# Patient Record
Sex: MALE | Race: WHITE | NOT HISPANIC OR LATINO | Employment: FULL TIME | URBAN - METROPOLITAN AREA
[De-identification: names, ages, dates, MRNs, and addresses within clinical notes are randomized per-mention and may not be internally consistent; named-entity substitution may affect disease eponyms.]

---

## 2017-11-14 ENCOUNTER — ALLSCRIPTS OFFICE VISIT (OUTPATIENT)
Dept: OTHER | Facility: OTHER | Age: 52
End: 2017-11-14

## 2017-11-14 DIAGNOSIS — Z12.5 ENCOUNTER FOR SCREENING FOR MALIGNANT NEOPLASM OF PROSTATE: ICD-10-CM

## 2017-11-14 DIAGNOSIS — L98.9 DISORDER OF SKIN OR SUBCUTANEOUS TISSUE: ICD-10-CM

## 2017-11-14 DIAGNOSIS — Z13.6 ENCOUNTER FOR SCREENING FOR CARDIOVASCULAR DISORDERS: ICD-10-CM

## 2017-11-15 NOTE — PROGRESS NOTES
Assessment    1  Family history of cerebral aneurysm (V17 1) (Z82 49) : Father   2  Family history of malignant neoplasm of prostate (V16 42) (H42 50) : Father   3  Family history of coronary artery disease (V17 3) (Z82 49) : Father   4  Chest skin lesion (709 9) (L98 9)   5  Screening for colon cancer (V76 51) (Z12 11)   6  Family history of myocardial infarction (V17 3) (Z82 49) : Father    Plan  Chest skin lesion    · (1) CBC/PLT/DIFF; Status:Active; Requested for:14Nov2017;    · (1) COMPREHENSIVE METABOLIC PANEL; Status:Active; Requested for:14Nov2017;    · 2 - Dee Peterson DO  (Dermatology) Co-Management  *  Status: Hold For -Scheduling  Requested for: [id number]  Care Summary provided  : Yes  Encounter for prostate cancer screening, Encounter for screening for cardiovasculardisorders    · (1) PSA (SCREEN) (Dx V76 44 Screen for Prostate Cancer); Status:Active; Requestedfor:14Nov2017;   Encounter for screening for cardiovascular disorders    · (1) LIPID PANEL, FASTING; Status:Active; Requested for:14Nov2017; Health Maintenance    · Follow-up visit in 1 year Evaluation and Treatment  Follow-up  Status: Hold For -Scheduling  Requested for: [id number]   · Stop: Fluzone Quadrivalent Intramuscular Suspension  Screening for colon cancer    · COLONOSCOPY; Status:Active; Requested for:14Nov2017;     Discussion/Summary  Discussion Summary: 1  Skin lesion on anterior mid-chest area - recommended dermatology evaluation  - recommended to schedule colonoscopy  Check labs  declined Flu vaccination  patient to followup a low cholesterol diet, regular exercise  physical exam next year  Counseling Documentation With Imm: The patient was counseled regarding instructions for management,-- risk factor reductions,-- risks and benefits of treatment options,-- importance of compliance with treatment  Medication SE Review and Pt Understands Tx: The treatment plan was reviewed with the patient/guardian   The patient/guardian understands and agrees with the treatment plan      Chief Complaint  Chief Complaint Free Text Note Form: Former patient here to reestablish care  History of Present Illness  HPI: Patient presents to the office to reestablish medical care  was last seen over 3 years ago  does not take any medications at the present time  skin lesion on his chest for 10 years  Patient states that he had biopsy done which was negative for malignancy  No report available for review  lesion slightly increased in size  history is positive for coronary artery disease, MI, prostate CA in his father  is very active, He goes to the gym 5 times per week  Denies chest pain, shortness of breath, dizziness  recent blood work  tobacco use  Declined Flu vaccination  previous colonoscopy  Review of Systems  Complete-Male:  Constitutional: no fever,-- no chills-- and-- not feeling tired  Weight has been stable  Eyes: wears glasses, but-- no eye pain,-- no dryness of the eyes,-- eyes not red,-- no purulent discharge from the eyes-- and-- no itching of the eyes  No visual disturbances  ENT: no earache,-- no nosebleeds,-- no sore throat,-- no hearing loss,-- no nasal discharge-- and-- no hoarseness  Cardiovascular: no chest pain,-- no intermittent leg claudication,-- no palpitations-- and-- no extremity edema  Respiratory: no shortness of breath,-- no cough-- and-- no wheezing  Gastrointestinal: No complaints of abdominal pain, no constipation, no nausea or vomiting, no diarrhea or bloody stools  Genitourinary: no dysuria,-- no incontinence-- and-- no nocturia  Musculoskeletal: no arthralgias,-- no joint swelling-- and-- no joint stiffness  Integumentary: no rashes,-- no itching-- and-- no skin wound  Neurological: no headache,-- no numbness,-- no tingling,-- no dizziness-- and-- no fainting  Psychiatric: no anxiety,-- no sleep disturbances-- and-- no depression  Endocrine: no muscle weakness    Hematologic/Lymphatic: No complaints of swollen glands, no swollen glands in the neck, does not bleed easily, no easy bruising  Active Problems  1  Encounter for screening colonoscopy (V76 51) (Z12 11)   2  Pain in left foot (729 5) (J81 012)    Past Medical History  Active Problems And Past Medical History Reviewed: The active problems and past medical history were reviewed and updated today  Surgical History  1  History of Prior Surgical Procedure Not Done  Surgical History Reviewed: The surgical history was reviewed and updated today  Family History  Father    1  Family history of cerebral aneurysm (V17 1) (Z82 49)   2  Family history of coronary artery disease (V17 3) (Z82 49)   3  Family history of malignant neoplasm of prostate (V16 42) (Z80 42)   4  Family history of myocardial infarction (V17 3) (Z82 49)  Maternal Grandfather    5  Family history of Diabetes Mellitus (V18 0)  Family History Reviewed: The family history was reviewed and updated today  Social History   · Being A Social Drinker   · Caffeine use (V49 89) (F15 90)   · Never A Smoker  Social History Reviewed: The social history was reviewed and updated today  Allergies  1  No Known Drug Allergies    Vitals  Vital Signs    Recorded: 73RLV0436 01:41PM   Temperature 97 1 F, Tympanic   Heart Rate 72, L Radial   Respiration 16   Systolic 047, LUE   Diastolic 80, LUE   Height 5 ft 7 in   Weight 188 lb 6 4 oz   BMI Calculated 29 51   BSA Calculated 1 97   Pain Scale 0       Physical Exam   Constitutional  General appearance: No acute distress, well appearing and well nourished  Eyes  Conjunctiva and lids: No swelling, erythema, or discharge  Pupils and irises: Equal, round and reactive to light  Ears, Nose, Mouth, and Throat  External inspection of ears and nose: Normal    Otoscopic examination: Tympanic membrance translucent with normal light reflex  Canals patent without erythema     Oropharynx: Normal with no erythema, edema, exudate or lesions  Pulmonary  Respiratory effort: No increased work of breathing or signs of respiratory distress  Auscultation of lungs: Clear to auscultation, equal breath sounds bilaterally, no wheezes, no rales, no rhonci  Cardiovascular  Auscultation of heart: Normal rate and rhythm, normal S1 and S2, without murmurs  Examination of extremities for edema and/or varicosities: Normal    Carotid pulses: Normal  -- no carotid bruits  Abdomen  Abdomen: Non-tender, no masses  -- no abdominal bruits  Liver and spleen: No hepatomegaly or splenomegaly  Musculoskeletal  Gait and station: Normal    Digits and nails: Normal without clubbing or cyanosis  Inspection/palpation of joints, bones, and muscles: Normal    Skin  Skin and subcutaneous tissue: Abnormal   Raised dark skin lesion 15 x10 mm on anterior mid-chest   3 small verrucous skin lesions on forehead    Psychiatric  Mood and affect: Normal          Signatures   Electronically signed by : ANNIE Cunningham ; Nov 14 2017  2:27PM EST                       (Author)

## 2017-11-22 ENCOUNTER — GENERIC CONVERSION - ENCOUNTER (OUTPATIENT)
Dept: OTHER | Facility: OTHER | Age: 52
End: 2017-11-22

## 2017-11-22 ENCOUNTER — APPOINTMENT (OUTPATIENT)
Dept: LAB | Age: 52
End: 2017-11-22
Payer: COMMERCIAL

## 2017-11-22 ENCOUNTER — TRANSCRIBE ORDERS (OUTPATIENT)
Dept: ADMINISTRATIVE | Age: 52
End: 2017-11-22

## 2017-11-22 DIAGNOSIS — Z12.5 ENCOUNTER FOR SCREENING FOR MALIGNANT NEOPLASM OF PROSTATE: ICD-10-CM

## 2017-11-22 DIAGNOSIS — Z13.6 ENCOUNTER FOR SCREENING FOR CARDIOVASCULAR DISORDERS: ICD-10-CM

## 2017-11-22 DIAGNOSIS — L98.9 DISORDER OF SKIN OR SUBCUTANEOUS TISSUE: ICD-10-CM

## 2017-11-22 LAB
ALBUMIN SERPL BCP-MCNC: 3.8 G/DL (ref 3.5–5)
ALP SERPL-CCNC: 62 U/L (ref 46–116)
ALT SERPL W P-5'-P-CCNC: 39 U/L (ref 12–78)
ANION GAP SERPL CALCULATED.3IONS-SCNC: 7 MMOL/L (ref 4–13)
AST SERPL W P-5'-P-CCNC: 23 U/L (ref 5–45)
BASOPHILS # BLD AUTO: 0.02 THOUSANDS/ΜL (ref 0–0.1)
BASOPHILS NFR BLD AUTO: 0 % (ref 0–1)
BILIRUB SERPL-MCNC: 0.93 MG/DL (ref 0.2–1)
BUN SERPL-MCNC: 21 MG/DL (ref 5–25)
CALCIUM SERPL-MCNC: 8.9 MG/DL (ref 8.3–10.1)
CHLORIDE SERPL-SCNC: 105 MMOL/L (ref 100–108)
CHOLEST SERPL-MCNC: 134 MG/DL (ref 50–200)
CO2 SERPL-SCNC: 29 MMOL/L (ref 21–32)
CREAT SERPL-MCNC: 1.24 MG/DL (ref 0.6–1.3)
EOSINOPHIL # BLD AUTO: 0.13 THOUSAND/ΜL (ref 0–0.61)
EOSINOPHIL NFR BLD AUTO: 2 % (ref 0–6)
ERYTHROCYTE [DISTWIDTH] IN BLOOD BY AUTOMATED COUNT: 12.3 % (ref 11.6–15.1)
GFR SERPL CREATININE-BSD FRML MDRD: 66 ML/MIN/1.73SQ M
GLUCOSE P FAST SERPL-MCNC: 100 MG/DL (ref 65–99)
HCT VFR BLD AUTO: 44.9 % (ref 36.5–49.3)
HDLC SERPL-MCNC: 43 MG/DL (ref 40–60)
HGB BLD-MCNC: 15.3 G/DL (ref 12–17)
LDLC SERPL CALC-MCNC: 72 MG/DL (ref 0–100)
LYMPHOCYTES # BLD AUTO: 1.37 THOUSANDS/ΜL (ref 0.6–4.47)
LYMPHOCYTES NFR BLD AUTO: 23 % (ref 14–44)
MCH RBC QN AUTO: 30.5 PG (ref 26.8–34.3)
MCHC RBC AUTO-ENTMCNC: 34.1 G/DL (ref 31.4–37.4)
MCV RBC AUTO: 90 FL (ref 82–98)
MONOCYTES # BLD AUTO: 0.46 THOUSAND/ΜL (ref 0.17–1.22)
MONOCYTES NFR BLD AUTO: 8 % (ref 4–12)
NEUTROPHILS # BLD AUTO: 4.1 THOUSANDS/ΜL (ref 1.85–7.62)
NEUTS SEG NFR BLD AUTO: 67 % (ref 43–75)
NRBC BLD AUTO-RTO: 0 /100 WBCS
PLATELET # BLD AUTO: 199 THOUSANDS/UL (ref 149–390)
PMV BLD AUTO: 10.5 FL (ref 8.9–12.7)
POTASSIUM SERPL-SCNC: 4.4 MMOL/L (ref 3.5–5.3)
PROT SERPL-MCNC: 6.9 G/DL (ref 6.4–8.2)
PSA SERPL-MCNC: 2.6 NG/ML (ref 0–4)
RBC # BLD AUTO: 5.01 MILLION/UL (ref 3.88–5.62)
SODIUM SERPL-SCNC: 141 MMOL/L (ref 136–145)
TRIGL SERPL-MCNC: 93 MG/DL
WBC # BLD AUTO: 6.1 THOUSAND/UL (ref 4.31–10.16)

## 2017-11-22 PROCEDURE — 36415 COLL VENOUS BLD VENIPUNCTURE: CPT

## 2017-11-22 PROCEDURE — 80061 LIPID PANEL: CPT

## 2017-11-22 PROCEDURE — 85025 COMPLETE CBC W/AUTO DIFF WBC: CPT

## 2017-11-22 PROCEDURE — 80053 COMPREHEN METABOLIC PANEL: CPT

## 2017-11-22 PROCEDURE — G0103 PSA SCREENING: HCPCS

## 2017-12-26 ENCOUNTER — GENERIC CONVERSION - ENCOUNTER (OUTPATIENT)
Dept: FAMILY MEDICINE CLINIC | Facility: CLINIC | Age: 52
End: 2017-12-26

## 2018-01-12 VITALS
RESPIRATION RATE: 16 BRPM | HEIGHT: 67 IN | TEMPERATURE: 97.1 F | BODY MASS INDEX: 29.57 KG/M2 | DIASTOLIC BLOOD PRESSURE: 80 MMHG | HEART RATE: 72 BPM | SYSTOLIC BLOOD PRESSURE: 140 MMHG | WEIGHT: 188.4 LBS

## 2018-01-15 NOTE — RESULT NOTES
Verified Results  (1) CBC/PLT/DIFF [id number] 06:49AM [facility]   TW Order Number: [id number]     Test Name Result Flag Reference   WBC COUNT 6 10 Thousand/uL  4 31-10 16   RBC COUNT 5 01 Million/uL  3 88-5 62   HEMOGLOBIN 15 3 g/dL  12 0-17 0   HEMATOCRIT 44 9 %  36 5-49 3   MCV 90 fL  82-98   MCH 30 5 pg  26 8-34 3   MCHC 34 1 g/dL  31 4-37 4   RDW 12 3 %  11 6-15 1   MPV 10 5 fL  8 9-12 7   PLATELET COUNT 055 Thousands/uL  149-390   nRBC AUTOMATED 0 /100 WBCs     NEUTROPHILS RELATIVE PERCENT 67 %  43-75   LYMPHOCYTES RELATIVE PERCENT 23 %  14-44   MONOCYTES RELATIVE PERCENT 8 %  4-12   EOSINOPHILS RELATIVE PERCENT 2 %  0-6   BASOPHILS RELATIVE PERCENT 0 %  0-1   NEUTROPHILS ABSOLUTE COUNT 4 10 Thousands/? ??L  1 85-7 62   LYMPHOCYTES ABSOLUTE COUNT 1 37 Thousands/? ??L  0 60-4 47   MONOCYTES ABSOLUTE COUNT 0 46 Thousand/? ??L  0 17-1 22   EOSINOPHILS ABSOLUTE COUNT 0 13 Thousand/? ??L  0 00-0 61   BASOPHILS ABSOLUTE COUNT 0 02 Thousands/? ??L  0 00-0 10     (1) COMPREHENSIVE METABOLIC PANEL [id number] 46:69FJ [facility]    Order Number: [id number]     Test Name Result Flag Reference   SODIUM 141 mmol/L  136-145   POTASSIUM 4 4 mmol/L  3 5-5 3   CHLORIDE 105 mmol/L  100-108   CARBON DIOXIDE 29 mmol/L  21-32   ANION GAP (CALC) 7 mmol/L  4-13   BLOOD UREA NITROGEN 21 mg/dL  5-25   CREATININE 1 24 mg/dL  0 60-1 30   Standardized to IDMS reference method   CALCIUM 8 9 mg/dL  8 3-10 1   BILI, TOTAL 0 93 mg/dL  0 20-1 00   ALK PHOSPHATAS 62 U/L     ALT (SGPT) 39 U/L  12-78   Specimen collection should occur prior to Sulfasalazine and/or Sulfapyridine administration due to the potential for falsely depressed results  AST(SGOT) 23 U/L  5-45   Specimen collection should occur prior to Sulfasalazine administration due to the potential for falsely depressed results     ALBUMIN 3 8 g/dL  3 5-5 0   TOTAL PROTEIN 6 9 g/dL  6 4-8 2   eGFR 66 ml/min/1 73sq m     National Kidney Disease Education Program recommendations are as follows:  GFR calculation is accurate only with a steady state creatinine  Chronic Kidney disease less than 60 ml/min/1 73 sq  meters  Kidney failure less than 15 ml/min/1 73 sq  meters  GLUCOSE FASTING 100 mg/dL H 65-99   Specimen collection should occur prior to Sulfasalazine administration due to the potential for falsely depressed results  Specimen collection should occur prior to Sulfapyridine administration due to the potential for falsely elevated results  (1) LIPID PANEL, FASTING 22Nov2017 06:49AM [facility] Number: [id number]     Test Name Result Flag Reference   CHOLESTEROL 134 mg/dL     HDL,DIRECT 43 mg/dL  40-60   Specimen collection should occur prior to Metamizole administration due to the potential for falsley depressed results  LDL CHOLESTEROL CALCULATED 72 mg/dL  0-100   Triglyceride:        Normal <150 mg/dl   Borderline High 150-199 mg/dl   High 200-499 mg/dl   Very High >499 mg/dl      Cholesterol:       Desirable <200 mg/dl    Borderline High 200-239 mg/dl    High >239 mg/dl      HDL Cholesterol:       High>59 mg/dL    Low <41 mg/dL      This screening LDL is a calculated result  It does not have the accuracy of the Direct Measured LDL in the monitoring of patients with hyperlipidemia and/or statin therapy  Direct Measure LDL (LVC494) must be ordered separately in these patients  TRIGLYCERIDES 93 mg/dL  <=150   Specimen collection should occur prior to N-Acetylcysteine or Metamizole administration due to the potential for falsely depressed results       (1) PSA (SCREEN) (Dx V76 44 Screen for Prostate Cancer) 22Nov2017 06:49AM [facility] Order Number: [id number]     Test Name Result Flag Reference   PROSTATE SPECIFIC ANTIGEN 2 6 ng/mL  0 0-4 0   American Urological Association Guidelines define biochemical recurrence of prostate cancer as a detectable or rising PSA value post-radical prostatectomy that is greater than or equal to 0 2 ng/mL with a second confirmatory level of greater than or equal to 0 2 ng/mL         Plan  Elevated PSA    · 2 - Isaiah Zavaleta MD, Keith Hernandez (Urology) Co-Management  *  Status: Hold For - Scheduling   Requested for: [id number]  Care Summary provided  : Yes

## 2019-05-01 ENCOUNTER — OFFICE VISIT (OUTPATIENT)
Dept: FAMILY MEDICINE CLINIC | Facility: CLINIC | Age: 54
End: 2019-05-01
Payer: COMMERCIAL

## 2019-05-01 VITALS
RESPIRATION RATE: 12 BRPM | HEART RATE: 54 BPM | OXYGEN SATURATION: 98 % | HEIGHT: 67 IN | WEIGHT: 194.2 LBS | BODY MASS INDEX: 30.48 KG/M2 | SYSTOLIC BLOOD PRESSURE: 122 MMHG | TEMPERATURE: 97.6 F | DIASTOLIC BLOOD PRESSURE: 74 MMHG

## 2019-05-01 DIAGNOSIS — Z12.5 SCREENING FOR PROSTATE CANCER: ICD-10-CM

## 2019-05-01 DIAGNOSIS — K42.9 UMBILICAL HERNIA WITHOUT OBSTRUCTION AND WITHOUT GANGRENE: ICD-10-CM

## 2019-05-01 DIAGNOSIS — Z13.6 SCREENING FOR CARDIOVASCULAR CONDITION: ICD-10-CM

## 2019-05-01 DIAGNOSIS — Z00.00 WELL ADULT EXAM: Primary | ICD-10-CM

## 2019-05-01 DIAGNOSIS — Z11.59 ENCOUNTER FOR HEPATITIS C VIRUS SCREENING TEST FOR HIGH RISK PATIENT: ICD-10-CM

## 2019-05-01 DIAGNOSIS — Z91.89 ENCOUNTER FOR HEPATITIS C VIRUS SCREENING TEST FOR HIGH RISK PATIENT: ICD-10-CM

## 2019-05-01 PROCEDURE — 99396 PREV VISIT EST AGE 40-64: CPT | Performed by: FAMILY MEDICINE

## 2019-05-08 ENCOUNTER — CONSULT (OUTPATIENT)
Dept: SURGERY | Facility: CLINIC | Age: 54
End: 2019-05-08
Payer: COMMERCIAL

## 2019-05-08 VITALS
DIASTOLIC BLOOD PRESSURE: 78 MMHG | BODY MASS INDEX: 30.54 KG/M2 | HEIGHT: 67 IN | TEMPERATURE: 96.5 F | SYSTOLIC BLOOD PRESSURE: 132 MMHG | WEIGHT: 194.6 LBS

## 2019-05-08 DIAGNOSIS — K42.9 UMBILICAL HERNIA WITHOUT OBSTRUCTION AND WITHOUT GANGRENE: ICD-10-CM

## 2019-05-08 PROCEDURE — 99244 OFF/OP CNSLTJ NEW/EST MOD 40: CPT | Performed by: SURGERY

## 2019-05-09 PROBLEM — K43.9 EPIGASTRIC HERNIA: Status: ACTIVE | Noted: 2019-05-09

## 2019-05-09 PROBLEM — K42.9 UMBILICAL HERNIA WITHOUT OBSTRUCTION AND WITHOUT GANGRENE: Status: ACTIVE | Noted: 2019-05-09

## 2019-05-10 ENCOUNTER — LAB (OUTPATIENT)
Dept: LAB | Age: 54
End: 2019-05-10
Payer: COMMERCIAL

## 2019-05-10 ENCOUNTER — TRANSCRIBE ORDERS (OUTPATIENT)
Dept: ADMINISTRATIVE | Age: 54
End: 2019-05-10

## 2019-05-10 DIAGNOSIS — Z12.5 SCREENING FOR PROSTATE CANCER: ICD-10-CM

## 2019-05-10 DIAGNOSIS — Z91.89 ENCOUNTER FOR HEPATITIS C VIRUS SCREENING TEST FOR HIGH RISK PATIENT: ICD-10-CM

## 2019-05-10 DIAGNOSIS — Z00.00 WELL ADULT EXAM: ICD-10-CM

## 2019-05-10 DIAGNOSIS — Z11.59 ENCOUNTER FOR HEPATITIS C VIRUS SCREENING TEST FOR HIGH RISK PATIENT: ICD-10-CM

## 2019-05-10 DIAGNOSIS — Z13.6 SCREENING FOR CARDIOVASCULAR CONDITION: ICD-10-CM

## 2019-05-10 LAB
ALBUMIN SERPL BCP-MCNC: 4.1 G/DL (ref 3.5–5)
ALP SERPL-CCNC: 66 U/L (ref 46–116)
ALT SERPL W P-5'-P-CCNC: 41 U/L (ref 12–78)
ANION GAP SERPL CALCULATED.3IONS-SCNC: 5 MMOL/L (ref 4–13)
AST SERPL W P-5'-P-CCNC: 17 U/L (ref 5–45)
BASOPHILS # BLD AUTO: 0.03 THOUSANDS/ΜL (ref 0–0.1)
BASOPHILS NFR BLD AUTO: 0 % (ref 0–1)
BILIRUB SERPL-MCNC: 0.82 MG/DL (ref 0.2–1)
BUN SERPL-MCNC: 24 MG/DL (ref 5–25)
CALCIUM SERPL-MCNC: 8.8 MG/DL (ref 8.3–10.1)
CHLORIDE SERPL-SCNC: 107 MMOL/L (ref 100–108)
CHOLEST SERPL-MCNC: 159 MG/DL (ref 50–200)
CO2 SERPL-SCNC: 27 MMOL/L (ref 21–32)
CREAT SERPL-MCNC: 1.26 MG/DL (ref 0.6–1.3)
EOSINOPHIL # BLD AUTO: 0.11 THOUSAND/ΜL (ref 0–0.61)
EOSINOPHIL NFR BLD AUTO: 2 % (ref 0–6)
ERYTHROCYTE [DISTWIDTH] IN BLOOD BY AUTOMATED COUNT: 12 % (ref 11.6–15.1)
GFR SERPL CREATININE-BSD FRML MDRD: 64 ML/MIN/1.73SQ M
GLUCOSE P FAST SERPL-MCNC: 109 MG/DL (ref 65–99)
HCT VFR BLD AUTO: 47.7 % (ref 36.5–49.3)
HCV AB SER QL: NORMAL
HDLC SERPL-MCNC: 39 MG/DL (ref 40–60)
HGB BLD-MCNC: 15.4 G/DL (ref 12–17)
IMM GRANULOCYTES # BLD AUTO: 0.02 THOUSAND/UL (ref 0–0.2)
IMM GRANULOCYTES NFR BLD AUTO: 0 % (ref 0–2)
LDLC SERPL CALC-MCNC: 87 MG/DL (ref 0–100)
LYMPHOCYTES # BLD AUTO: 1.7 THOUSANDS/ΜL (ref 0.6–4.47)
LYMPHOCYTES NFR BLD AUTO: 25 % (ref 14–44)
MCH RBC QN AUTO: 30.1 PG (ref 26.8–34.3)
MCHC RBC AUTO-ENTMCNC: 32.3 G/DL (ref 31.4–37.4)
MCV RBC AUTO: 93 FL (ref 82–98)
MONOCYTES # BLD AUTO: 0.35 THOUSAND/ΜL (ref 0.17–1.22)
MONOCYTES NFR BLD AUTO: 5 % (ref 4–12)
NEUTROPHILS # BLD AUTO: 4.68 THOUSANDS/ΜL (ref 1.85–7.62)
NEUTS SEG NFR BLD AUTO: 68 % (ref 43–75)
NONHDLC SERPL-MCNC: 120 MG/DL
NRBC BLD AUTO-RTO: 0 /100 WBCS
PLATELET # BLD AUTO: 201 THOUSANDS/UL (ref 149–390)
PMV BLD AUTO: 11.1 FL (ref 8.9–12.7)
POTASSIUM SERPL-SCNC: 4.5 MMOL/L (ref 3.5–5.3)
PROT SERPL-MCNC: 7.2 G/DL (ref 6.4–8.2)
PSA SERPL-MCNC: 3.4 NG/ML (ref 0–4)
RBC # BLD AUTO: 5.12 MILLION/UL (ref 3.88–5.62)
SODIUM SERPL-SCNC: 139 MMOL/L (ref 136–145)
TRIGL SERPL-MCNC: 163 MG/DL
WBC # BLD AUTO: 6.89 THOUSAND/UL (ref 4.31–10.16)

## 2019-05-10 PROCEDURE — 86803 HEPATITIS C AB TEST: CPT

## 2019-05-10 PROCEDURE — G0103 PSA SCREENING: HCPCS

## 2019-05-10 PROCEDURE — 36415 COLL VENOUS BLD VENIPUNCTURE: CPT

## 2019-05-10 PROCEDURE — 85025 COMPLETE CBC W/AUTO DIFF WBC: CPT

## 2019-05-10 PROCEDURE — 80061 LIPID PANEL: CPT

## 2019-05-10 PROCEDURE — 80053 COMPREHEN METABOLIC PANEL: CPT

## 2019-05-11 DIAGNOSIS — R97.20 RISING PSA LEVEL: Primary | ICD-10-CM

## 2019-05-28 ENCOUNTER — CONSULT (OUTPATIENT)
Dept: UROLOGY | Facility: AMBULATORY SURGERY CENTER | Age: 54
End: 2019-05-28
Payer: COMMERCIAL

## 2019-05-28 VITALS
WEIGHT: 190 LBS | HEART RATE: 81 BPM | BODY MASS INDEX: 29.82 KG/M2 | DIASTOLIC BLOOD PRESSURE: 74 MMHG | SYSTOLIC BLOOD PRESSURE: 120 MMHG | HEIGHT: 67 IN

## 2019-05-28 DIAGNOSIS — R97.20 RISING PSA LEVEL: Primary | ICD-10-CM

## 2019-05-28 PROCEDURE — 99244 OFF/OP CNSLTJ NEW/EST MOD 40: CPT | Performed by: UROLOGY

## 2019-05-29 ENCOUNTER — ANESTHESIA EVENT (OUTPATIENT)
Dept: PERIOP | Facility: HOSPITAL | Age: 54
End: 2019-05-29
Payer: COMMERCIAL

## 2019-05-30 ENCOUNTER — ANESTHESIA (OUTPATIENT)
Dept: PERIOP | Facility: HOSPITAL | Age: 54
End: 2019-05-30
Payer: COMMERCIAL

## 2019-05-30 ENCOUNTER — HOSPITAL ENCOUNTER (OUTPATIENT)
Facility: HOSPITAL | Age: 54
Setting detail: OUTPATIENT SURGERY
Discharge: HOME | End: 2019-05-30
Attending: SURGERY | Admitting: SURGERY
Payer: COMMERCIAL

## 2019-05-30 VITALS
TEMPERATURE: 96.9 F | DIASTOLIC BLOOD PRESSURE: 62 MMHG | OXYGEN SATURATION: 94 % | HEART RATE: 84 BPM | HEIGHT: 68 IN | RESPIRATION RATE: 16 BRPM | BODY MASS INDEX: 28.79 KG/M2 | SYSTOLIC BLOOD PRESSURE: 114 MMHG | WEIGHT: 190 LBS

## 2019-05-30 DIAGNOSIS — K43.9 EPIGASTRIC HERNIA: ICD-10-CM

## 2019-05-30 DIAGNOSIS — K42.9 UMBILICAL HERNIA WITHOUT OBSTRUCTION AND WITHOUT GANGRENE: Primary | ICD-10-CM

## 2019-05-30 PROCEDURE — C1781 MESH (IMPLANTABLE): HCPCS | Performed by: SURGERY

## 2019-05-30 PROCEDURE — 49585 PR REPAIR UMBILICAL HERN,5+Y/O,REDUC: CPT | Performed by: SURGERY

## 2019-05-30 DEVICE — VENTRALEX ST HERNIA PATCH
Type: IMPLANTABLE DEVICE | Site: UMBILICAL | Status: FUNCTIONAL
Brand: VENTRALEX ST HERNIA PATCH

## 2019-05-30 RX ORDER — PROMETHAZINE HYDROCHLORIDE 25 MG/ML
12.5 INJECTION, SOLUTION INTRAMUSCULAR; INTRAVENOUS ONCE
Status: DISCONTINUED | OUTPATIENT
Start: 2019-05-30 | End: 2019-05-30 | Stop reason: HOSPADM

## 2019-05-30 RX ORDER — OXYCODONE HYDROCHLORIDE AND ACETAMINOPHEN 5; 325 MG/1; MG/1
1 TABLET ORAL EVERY 4 HOURS PRN
Status: DISCONTINUED | OUTPATIENT
Start: 2019-05-30 | End: 2019-05-30 | Stop reason: HOSPADM

## 2019-05-30 RX ORDER — PROPOFOL 10 MG/ML
INJECTION, EMULSION INTRAVENOUS AS NEEDED
Status: DISCONTINUED | OUTPATIENT
Start: 2019-05-30 | End: 2019-05-30 | Stop reason: SURG

## 2019-05-30 RX ORDER — DEXAMETHASONE SODIUM PHOSPHATE 10 MG/ML
INJECTION, SOLUTION INTRAMUSCULAR; INTRAVENOUS AS NEEDED
Status: DISCONTINUED | OUTPATIENT
Start: 2019-05-30 | End: 2019-05-30 | Stop reason: SURG

## 2019-05-30 RX ORDER — OXYCODONE HYDROCHLORIDE 5 MG/1
5 TABLET ORAL EVERY 4 HOURS PRN
Qty: 20 TABLET | Refills: 0 | Status: SHIPPED | OUTPATIENT
Start: 2019-05-30 | End: 2019-06-09

## 2019-05-30 RX ORDER — LIDOCAINE HYDROCHLORIDE 10 MG/ML
0.5 INJECTION, SOLUTION EPIDURAL; INFILTRATION; INTRACAUDAL; PERINEURAL ONCE AS NEEDED
Status: COMPLETED | OUTPATIENT
Start: 2019-05-30 | End: 2019-05-30

## 2019-05-30 RX ORDER — ONDANSETRON 2 MG/ML
4 INJECTION INTRAMUSCULAR; INTRAVENOUS ONCE AS NEEDED
Status: DISCONTINUED | OUTPATIENT
Start: 2019-05-30 | End: 2019-05-30 | Stop reason: HOSPADM

## 2019-05-30 RX ORDER — OXYCODONE HYDROCHLORIDE AND ACETAMINOPHEN 5; 325 MG/1; MG/1
2 TABLET ORAL EVERY 4 HOURS PRN
Status: DISCONTINUED | OUTPATIENT
Start: 2019-05-30 | End: 2019-05-30 | Stop reason: HOSPADM

## 2019-05-30 RX ORDER — CEFAZOLIN SODIUM 1 G/3ML
INJECTION, POWDER, FOR SOLUTION INTRAMUSCULAR; INTRAVENOUS AS NEEDED
Status: DISCONTINUED | OUTPATIENT
Start: 2019-05-30 | End: 2019-05-30 | Stop reason: SURG

## 2019-05-30 RX ORDER — SODIUM CHLORIDE, SODIUM LACTATE, POTASSIUM CHLORIDE, CALCIUM CHLORIDE 600; 310; 30; 20 MG/100ML; MG/100ML; MG/100ML; MG/100ML
INJECTION, SOLUTION INTRAVENOUS CONTINUOUS PRN
Status: DISCONTINUED | OUTPATIENT
Start: 2019-05-30 | End: 2019-05-30 | Stop reason: SURG

## 2019-05-30 RX ORDER — KETOROLAC TROMETHAMINE 30 MG/ML
INJECTION, SOLUTION INTRAMUSCULAR; INTRAVENOUS AS NEEDED
Status: DISCONTINUED | OUTPATIENT
Start: 2019-05-30 | End: 2019-05-30 | Stop reason: SURG

## 2019-05-30 RX ORDER — SODIUM CHLORIDE, SODIUM LACTATE, POTASSIUM CHLORIDE, CALCIUM CHLORIDE 600; 310; 30; 20 MG/100ML; MG/100ML; MG/100ML; MG/100ML
100 INJECTION, SOLUTION INTRAVENOUS CONTINUOUS
Status: DISCONTINUED | OUTPATIENT
Start: 2019-05-30 | End: 2019-05-30 | Stop reason: HOSPADM

## 2019-05-30 RX ORDER — EPHEDRINE SULFATE 50 MG/ML
INJECTION INTRAVENOUS AS NEEDED
Status: DISCONTINUED | OUTPATIENT
Start: 2019-05-30 | End: 2019-05-30 | Stop reason: SURG

## 2019-05-30 RX ORDER — ONDANSETRON 2 MG/ML
INJECTION INTRAMUSCULAR; INTRAVENOUS AS NEEDED
Status: DISCONTINUED | OUTPATIENT
Start: 2019-05-30 | End: 2019-05-30 | Stop reason: SURG

## 2019-05-30 RX ORDER — ONDANSETRON 2 MG/ML
4 INJECTION INTRAMUSCULAR; INTRAVENOUS EVERY 6 HOURS PRN
Status: DISCONTINUED | OUTPATIENT
Start: 2019-05-30 | End: 2019-05-30 | Stop reason: HOSPADM

## 2019-05-30 RX ORDER — LIDOCAINE HYDROCHLORIDE 10 MG/ML
INJECTION, SOLUTION INFILTRATION; PERINEURAL AS NEEDED
Status: DISCONTINUED | OUTPATIENT
Start: 2019-05-30 | End: 2019-05-30 | Stop reason: SURG

## 2019-05-30 RX ORDER — MIDAZOLAM HYDROCHLORIDE 1 MG/ML
INJECTION INTRAMUSCULAR; INTRAVENOUS AS NEEDED
Status: DISCONTINUED | OUTPATIENT
Start: 2019-05-30 | End: 2019-05-30 | Stop reason: SURG

## 2019-05-30 RX ORDER — ACETAMINOPHEN 325 MG/1
975 TABLET ORAL EVERY 6 HOURS PRN
Status: DISCONTINUED | OUTPATIENT
Start: 2019-05-30 | End: 2019-05-30 | Stop reason: HOSPADM

## 2019-05-30 RX ORDER — BUPIVACAINE HYDROCHLORIDE AND EPINEPHRINE 5; 5 MG/ML; UG/ML
INJECTION, SOLUTION PERINEURAL AS NEEDED
Status: DISCONTINUED | OUTPATIENT
Start: 2019-05-30 | End: 2019-05-30 | Stop reason: HOSPADM

## 2019-05-30 RX ORDER — MEPERIDINE HYDROCHLORIDE 25 MG/ML
12.5 INJECTION INTRAMUSCULAR; INTRAVENOUS; SUBCUTANEOUS
Status: DISCONTINUED | OUTPATIENT
Start: 2019-05-30 | End: 2019-05-30 | Stop reason: HOSPADM

## 2019-05-30 RX ORDER — FENTANYL CITRATE/PF 50 MCG/ML
25 SYRINGE (ML) INJECTION
Status: DISCONTINUED | OUTPATIENT
Start: 2019-05-30 | End: 2019-05-30 | Stop reason: HOSPADM

## 2019-05-30 RX ORDER — HYDROMORPHONE HCL/PF 1 MG/ML
0.5 SYRINGE (ML) INJECTION
Status: DISCONTINUED | OUTPATIENT
Start: 2019-05-30 | End: 2019-05-30 | Stop reason: HOSPADM

## 2019-05-30 RX ORDER — FENTANYL CITRATE 50 UG/ML
INJECTION, SOLUTION INTRAMUSCULAR; INTRAVENOUS AS NEEDED
Status: DISCONTINUED | OUTPATIENT
Start: 2019-05-30 | End: 2019-05-30 | Stop reason: SURG

## 2019-05-30 RX ORDER — HYDROMORPHONE HCL/PF 1 MG/ML
0.2 SYRINGE (ML) INJECTION
Status: DISCONTINUED | OUTPATIENT
Start: 2019-05-30 | End: 2019-05-30 | Stop reason: HOSPADM

## 2019-05-30 RX ORDER — SODIUM CHLORIDE, SODIUM LACTATE, POTASSIUM CHLORIDE, CALCIUM CHLORIDE 600; 310; 30; 20 MG/100ML; MG/100ML; MG/100ML; MG/100ML
125 INJECTION, SOLUTION INTRAVENOUS CONTINUOUS
Status: DISCONTINUED | OUTPATIENT
Start: 2019-05-30 | End: 2019-05-30 | Stop reason: HOSPADM

## 2019-05-30 RX ADMIN — EPHEDRINE SULFATE 5 MG: 50 INJECTION, SOLUTION INTRAVENOUS at 11:22

## 2019-05-30 RX ADMIN — EPHEDRINE SULFATE 7.5 MG: 50 INJECTION, SOLUTION INTRAVENOUS at 10:36

## 2019-05-30 RX ADMIN — SODIUM CHLORIDE, SODIUM LACTATE, POTASSIUM CHLORIDE, AND CALCIUM CHLORIDE: .6; .31; .03; .02 INJECTION, SOLUTION INTRAVENOUS at 10:23

## 2019-05-30 RX ADMIN — EPHEDRINE SULFATE 5 MG: 50 INJECTION, SOLUTION INTRAVENOUS at 10:42

## 2019-05-30 RX ADMIN — PHENYLEPHRINE HYDROCHLORIDE 100 MCG: 10 INJECTION INTRAVENOUS at 10:40

## 2019-05-30 RX ADMIN — KETOROLAC TROMETHAMINE 30 MG: 30 INJECTION, SOLUTION INTRAMUSCULAR at 11:19

## 2019-05-30 RX ADMIN — LIDOCAINE HYDROCHLORIDE 0.5 ML: 10 INJECTION, SOLUTION EPIDURAL; INFILTRATION; INTRACAUDAL; PERINEURAL at 09:18

## 2019-05-30 RX ADMIN — FENTANYL CITRATE 50 MCG: 50 INJECTION, SOLUTION INTRAMUSCULAR; INTRAVENOUS at 10:47

## 2019-05-30 RX ADMIN — EPHEDRINE SULFATE 5 MG: 50 INJECTION, SOLUTION INTRAVENOUS at 10:49

## 2019-05-30 RX ADMIN — EPHEDRINE SULFATE 5 MG: 50 INJECTION, SOLUTION INTRAVENOUS at 10:51

## 2019-05-30 RX ADMIN — PROPOFOL 200 MG: 10 INJECTION, EMULSION INTRAVENOUS at 10:27

## 2019-05-30 RX ADMIN — DEXAMETHASONE SODIUM PHOSPHATE 10 MG: 10 INJECTION, SOLUTION INTRAMUSCULAR; INTRAVENOUS at 10:44

## 2019-05-30 RX ADMIN — CEFAZOLIN 2000 MG: 1 INJECTION, POWDER, FOR SOLUTION INTRAVENOUS at 10:30

## 2019-05-30 RX ADMIN — FENTANYL CITRATE 50 MCG: 50 INJECTION, SOLUTION INTRAMUSCULAR; INTRAVENOUS at 10:27

## 2019-05-30 RX ADMIN — EPHEDRINE SULFATE 5 MG: 50 INJECTION, SOLUTION INTRAVENOUS at 11:02

## 2019-05-30 RX ADMIN — LIDOCAINE HYDROCHLORIDE 5 MG: 10 INJECTION, SOLUTION INFILTRATION; PERINEURAL at 10:27

## 2019-05-30 RX ADMIN — FENTANYL CITRATE 100 MCG: 50 INJECTION, SOLUTION INTRAMUSCULAR; INTRAVENOUS at 10:32

## 2019-05-30 RX ADMIN — SODIUM CHLORIDE, SODIUM LACTATE, POTASSIUM CHLORIDE, AND CALCIUM CHLORIDE 125 ML/HR: .6; .31; .03; .02 INJECTION, SOLUTION INTRAVENOUS at 09:17

## 2019-05-30 RX ADMIN — EPHEDRINE SULFATE 5 MG: 50 INJECTION, SOLUTION INTRAVENOUS at 10:50

## 2019-05-30 RX ADMIN — EPHEDRINE SULFATE 5 MG: 50 INJECTION, SOLUTION INTRAVENOUS at 10:57

## 2019-05-30 RX ADMIN — PROPOFOL 50 MG: 10 INJECTION, EMULSION INTRAVENOUS at 10:50

## 2019-05-30 RX ADMIN — MIDAZOLAM 2 MG: 1 INJECTION INTRAMUSCULAR; INTRAVENOUS at 10:23

## 2019-05-30 RX ADMIN — ONDANSETRON 4 MG: 2 INJECTION INTRAMUSCULAR; INTRAVENOUS at 10:44

## 2019-06-12 ENCOUNTER — OFFICE VISIT (OUTPATIENT)
Dept: SURGERY | Facility: CLINIC | Age: 54
End: 2019-06-12

## 2019-06-12 VITALS
DIASTOLIC BLOOD PRESSURE: 88 MMHG | SYSTOLIC BLOOD PRESSURE: 128 MMHG | WEIGHT: 193.6 LBS | HEIGHT: 68 IN | BODY MASS INDEX: 29.34 KG/M2 | HEART RATE: 86 BPM | TEMPERATURE: 96.2 F

## 2019-06-12 DIAGNOSIS — Z87.19 STATUS POST HERNIA REPAIR: Primary | ICD-10-CM

## 2019-06-12 DIAGNOSIS — Z98.890 STATUS POST HERNIA REPAIR: Primary | ICD-10-CM

## 2019-06-12 PROBLEM — K42.9 UMBILICAL HERNIA WITHOUT OBSTRUCTION AND WITHOUT GANGRENE: Status: RESOLVED | Noted: 2019-05-09 | Resolved: 2019-06-12

## 2019-06-12 PROBLEM — K43.9 EPIGASTRIC HERNIA: Status: RESOLVED | Noted: 2019-05-09 | Resolved: 2019-06-12

## 2019-06-12 PROBLEM — K42.9 UMBILICAL HERNIA: Status: RESOLVED | Noted: 2019-05-01 | Resolved: 2019-06-12

## 2019-06-12 PROCEDURE — 99024 POSTOP FOLLOW-UP VISIT: CPT | Performed by: SURGERY

## 2019-08-13 ENCOUNTER — TELEPHONE (OUTPATIENT)
Dept: SURGERY | Facility: CLINIC | Age: 54
End: 2019-08-13

## 2019-08-13 NOTE — TELEPHONE ENCOUNTER
Patient sister, Janett Solis, called asking for a letter to be sent to a hotel, [employer] [employer] [address] in [place] she made reservations for on June 7th & 8th 2019  Prior to making those reservations for a trip to see family in [facility]) the patient, Dee Dee Garcias, had surgery on 5/30/19 and didn't want to travel to [facility]) for their visit, therefore the hotel reservations was canceled  The sister, Bettie Patricio, was unable to get a refund from the hotel after canceling  She wants to know if a letter be written up to file a claim with her credit card department to get reimbursed  Please advise

## 2019-11-04 ENCOUNTER — TELEPHONE (OUTPATIENT)
Dept: UROLOGY | Facility: AMBULATORY SURGERY CENTER | Age: 54
End: 2019-11-04

## 2019-11-04 NOTE — TELEPHONE ENCOUNTER
This patient left me a message about needed to reschedule his 3 month f/u in the [place] office  Please call him back to schedule appointment and remind him that PSA needs to be done prior to appointment  Thank you!

## 2019-11-04 NOTE — TELEPHONE ENCOUNTER
Called patient and left message for him to call our office back to get his 3 month visit rescheduled

## 2019-11-27 ENCOUNTER — APPOINTMENT (OUTPATIENT)
Dept: LAB | Age: 54
End: 2019-11-27
Payer: COMMERCIAL

## 2019-11-27 DIAGNOSIS — R97.20 RISING PSA LEVEL: ICD-10-CM

## 2019-11-27 LAB — PSA SERPL-MCNC: 3.4 NG/ML (ref 0–4)

## 2019-11-27 PROCEDURE — 84153 ASSAY OF PSA TOTAL: CPT

## 2019-12-03 ENCOUNTER — OFFICE VISIT (OUTPATIENT)
Dept: UROLOGY | Facility: AMBULATORY SURGERY CENTER | Age: 54
End: 2019-12-03
Payer: COMMERCIAL

## 2019-12-03 VITALS
DIASTOLIC BLOOD PRESSURE: 82 MMHG | SYSTOLIC BLOOD PRESSURE: 120 MMHG | HEART RATE: 74 BPM | HEIGHT: 68 IN | BODY MASS INDEX: 29.4 KG/M2 | WEIGHT: 194 LBS

## 2019-12-03 DIAGNOSIS — R97.20 ELEVATED PSA: Primary | ICD-10-CM

## 2019-12-03 PROCEDURE — 99214 OFFICE O/P EST MOD 30 MIN: CPT | Performed by: NURSE PRACTITIONER

## 2019-12-03 NOTE — LETTER
December 3, 2019     Mason Ryan MD  [facility]  [address]  [address]    Patient: Lucila Singh   YOB: 1965   Date of Visit: 12/3/2019       Dear Dr Dori Lopez: Thank you for referring Axel Spears to me for evaluation  Below are my notes for this consultation  If you have questions, please do not hesitate to call me  I look forward to following your patient along with you  Sincerely,        TALITA Michaels        CC: No Recipients  Xuan Wallace MD  12/3/2019 10:20 AM  Sign at close encounter  12/3/2019    Lucila Singh  1965  [id number]        Assessment  Rising/elevated PSA, family history of prostate cancer      Discussion  I had a lengthy discussion with Hernan Alvarez in the office today regarding his PSA of 2 6 dating back to 2017 as well as 2 recent repeat PSAs in 2019 of 3 4  We discussed his young age, strong family history of prostate cancer, and PSA velocity  We discussed options including a repeat PSA and repeat digital rectal examination within the next 3-6 months versus proceeding with a transrectal ultrasound-guided biopsy of the prostate  The patient is considering all options at this time and has requested an additional opinion from Dr Dori Lopez who removed his father's prostate more than 10 years ago  History of Present Illness  53 y o  male with a history of a PSA of 2 6 dating back to 2017  In May 2019 the patient had a PSA of 3 4 and was referred to Urology  He was seen by Dr Manda Winter  Follow-up was recommended in the next 3 months with a repeat PSA  The patient returns today and was scheduled to see an advanced practitioner who was initially unavailable for the office visit  I therefore saw the patient in follow-up today  His most recent PSA from November 2019 is stable at 3 4  He denies any lower urinary tract symptoms or erectile dysfunction    He states that he has a elevated testosterone level, however, there are no testosterone levels available for my review at [facility]  He states that his father is 78years of age and healthy  In his late 62s he underwent open radical retropubic prostatectomy performed by Dr Saroj Montgomery  Prior surgical history is remarkable for laparoscopic umbilical hernia repair  AUA Symptom Score  AUA SYMPTOM SCORE      Most Recent Value   AUA SYMPTOM SCORE   How often have you had a sensation of not emptying your bladder completely after you finished urinating? 1   How often have you had to urinate again less than two hours after you finished urinating? 0   How often have you found you stopped and started again several times when you urinate?  0   How often have you found it difficult to postpone urination? 0   How often have you had a weak urinary stream?  1   How often have you had to push or strain to begin urination? 0   How many times did you most typically get up to urinate from the time you went to bed at night until the time you got up in the morning?  0   Quality of Life: If you were to spend the rest of your life with your urinary condition just the way it is now, how would you feel about that?  0   AUA SYMPTOM SCORE  2          Review of Systems  Review of Systems   Constitutional: Negative  HENT: Negative  Eyes: Negative  Respiratory: Negative  Cardiovascular: Negative  Gastrointestinal: Negative  Endocrine: Negative  Genitourinary: Negative  Musculoskeletal: Negative  Skin: Negative  Allergic/Immunologic: Negative  Neurological: Negative  Hematological: Negative  Psychiatric/Behavioral: Negative            Past Medical History  Past Medical History:   Diagnosis Date    Obesity        Past Social History  Past Surgical History:   Procedure Laterality Date    ABDOMINAL HERNIA REPAIR N/A 5/30/2019    Procedure: REPAIR HERNIA EPIGASTRIC w/ mesh;  Surgeon: Jamie Ernandez MD;  [facility]: [facility];  Service: General    NO PAST SURGERIES  SD REPAIR UMBILICAL TWVP,7+N/L,NACIF N/A 5/30/2019    Procedure: REPAIR HERNIA UMBILICAL;  Surgeon: Tomas Najjar, MD;  Location: [facility];  Service: General       Past Family History  Family History   Problem Relation Age of Onset    Schizophrenia Mother     Cerebral aneurysm Father     Coronary artery disease Father     Prostate cancer Father     Heart attack Father     Diabetes Maternal Grandfather        Past Social history  Social History     Socioeconomic History    Marital status: /Civil Union     Spouse name: Not on file    Number of children: Not on file    Years of education: Not on file    Highest education level: Not on file   Occupational History    Not on file   Social Needs    Financial resource strain: Not on file    Food insecurity:     Worry: Not on file     Inability: Not on file    Transportation needs:     Medical: Not on file     Non-medical: Not on file   Tobacco Use    Smoking status: Never Smoker    Smokeless tobacco: Never Used   Substance and Sexual Activity    Alcohol use: Yes     Comment: Social     Drug use: Not on file    Sexual activity: Not on file   Lifestyle    Physical activity:     Days per week: Not on file     Minutes per session: Not on file    Stress: Not on file   Relationships    Social connections:     Talks on phone: Not on file     Gets together: Not on file     Attends Yarsani service: Not on file     Active member of club or organization: Not on file     Attends meetings of clubs or organizations: Not on file     Relationship status: Not on file    Intimate partner violence:     Fear of current or ex partner: Not on file     Emotionally abused: Not on file     Physically abused: Not on file     Forced sexual activity: Not on file   Other Topics Concern    Not on file   Social History Narrative    Caffeine use        Current Medications  No current outpatient medications on file       No current facility-administered medications for this visit  Allergies  No Known Allergies    Past Medical History, Social History, Family History, medications and allergies were reviewed  Vitals  Vitals:    12/03/19 0901   BP: 120/82   BP Location: Left arm   Patient Position: Sitting   Cuff Size: Adult   Pulse: 74   Weight: 88 kg (194 lb)   Height: 5' 8" (1 727 m)       Physical Exam  Physical Exam    On examination the patient is in no acute distress  Gait normal   Affect normal   Digital rectal examination was offered and recommended and the patient defers as this was recently performed by Dr Feliberto Banerjee who noted a 30 g prostate without nodularity  Results  Lab Results   Component Value Date    PSA 3 4 11/27/2019    PSA 3 4 05/10/2019    PSA 2 6 11/22/2017     Lab Results   Component Value Date    CALCIUM 8 8 05/10/2019    K 4 5 05/10/2019    CO2 27 05/10/2019     05/10/2019    BUN 24 05/10/2019    CREATININE 1 26 05/10/2019     Lab Results   Component Value Date    WBC 6 89 05/10/2019    HGB 15 4 05/10/2019    HCT 47 7 05/10/2019    MCV 93 05/10/2019     05/10/2019         Office Urine Dip  No results found for this or any previous visit (from the past 1 hour(s))  ]      Total visit time was 25 minutes of which over 50% was spent on counseling

## 2019-12-03 NOTE — PROGRESS NOTES
12/3/2019    Natividad Landa Stephane  1965  [id number]        Assessment  Rising/elevated PSA, family history of prostate cancer      Discussion  I had a lengthy discussion with Len Shane in the office today regarding his PSA of 2 6 dating back to 2017 as well as 2 recent repeat PSAs in 2019 of 3 4  We discussed his young age, strong family history of prostate cancer, and PSA velocity  We discussed options including a repeat PSA and repeat digital rectal examination within the next 3-6 months versus proceeding with a transrectal ultrasound-guided biopsy of the prostate  The patient is considering all options at this time and has requested an additional opinion from Dr Du Molina who removed his father's prostate more than 10 years ago  History of Present Illness  53 y o  male with a history of a PSA of 2 6 dating back to 2017  In May 2019 the patient had a PSA of 3 4 and was referred to Urology  He was seen by Dr Titi Boyd  Follow-up was recommended in the next 3 months with a repeat PSA  The patient returns today and was scheduled to see an advanced practitioner who was initially unavailable for the office visit  I therefore saw the patient in follow-up today  His most recent PSA from November 2019 is stable at 3 4  He denies any lower urinary tract symptoms or erectile dysfunction  He states that he has a elevated testosterone level, however, there are no testosterone levels available for my review at [facility]  He states that his father is 78years of age and healthy  In his late 62s he underwent open radical retropubic prostatectomy performed by Dr Du Molina  Prior surgical history is remarkable for laparoscopic umbilical hernia repair  AUA Symptom Score  AUA SYMPTOM SCORE      Most Recent Value   AUA SYMPTOM SCORE   How often have you had a sensation of not emptying your bladder completely after you finished urinating?   1   How often have you had to urinate again less than two hours after you finished urinating? 0   How often have you found you stopped and started again several times when you urinate?  0   How often have you found it difficult to postpone urination? 0   How often have you had a weak urinary stream?  1   How often have you had to push or strain to begin urination? 0   How many times did you most typically get up to urinate from the time you went to bed at night until the time you got up in the morning?  0   Quality of Life: If you were to spend the rest of your life with your urinary condition just the way it is now, how would you feel about that?  0   AUA SYMPTOM SCORE  2          Review of Systems  Review of Systems   Constitutional: Negative  HENT: Negative  Eyes: Negative  Respiratory: Negative  Cardiovascular: Negative  Gastrointestinal: Negative  Endocrine: Negative  Genitourinary: Negative  Musculoskeletal: Negative  Skin: Negative  Allergic/Immunologic: Negative  Neurological: Negative  Hematological: Negative  Psychiatric/Behavioral: Negative            Past Medical History  Past Medical History:   Diagnosis Date    Obesity        Past Social History  Past Surgical History:   Procedure Laterality Date    ABDOMINAL HERNIA REPAIR N/A 5/30/2019    Procedure: REPAIR HERNIA EPIGASTRIC w/ mesh;  Surgeon: Chris Arita MD;  [facility];  Service: General    NO PAST SURGERIES      WA REPAIR UMBILICAL JMDE,2+V/I,MLACE N/A 5/30/2019    Procedure: REPAIR HERNIA UMBILICAL;  Surgeon: Chris Arita MD;  Location[facility];  Service: General       Past Family History  Family History   Problem Relation Age of Onset    Schizophrenia Mother     Cerebral aneurysm Father     Coronary artery disease Father     Prostate cancer Father     Heart attack Father     Diabetes Maternal Grandfather        Past Social history  Social History     Socioeconomic History    Marital status: /Civil Union     Spouse name: Not on file    Number of children: Not on file    Years of education: Not on file    Highest education level: Not on file   Occupational History    Not on file   Social Needs    Financial resource strain: Not on file    Food insecurity:     Worry: Not on file     Inability: Not on file    Transportation needs:     Medical: Not on file     Non-medical: Not on file   Tobacco Use    Smoking status: Never Smoker    Smokeless tobacco: Never Used   Substance and Sexual Activity    Alcohol use: Yes     Comment: Social     Drug use: Not on file    Sexual activity: Not on file   Lifestyle    Physical activity:     Days per week: Not on file     Minutes per session: Not on file    Stress: Not on file   Relationships    Social connections:     Talks on phone: Not on file     Gets together: Not on file     Attends Buddhist service: Not on file     Active member of club or organization: Not on file     Attends meetings of clubs or organizations: Not on file     Relationship status: Not on file    Intimate partner violence:     Fear of current or ex partner: Not on file     Emotionally abused: Not on file     Physically abused: Not on file     Forced sexual activity: Not on file   Other Topics Concern    Not on file   Social History Narrative    Caffeine use        Current Medications  No current outpatient medications on file  No current facility-administered medications for this visit  Allergies  No Known Allergies    Past Medical History, Social History, Family History, medications and allergies were reviewed  Vitals  Vitals:    12/03/19 0901   BP: 120/82   BP Location: Left arm   Patient Position: Sitting   Cuff Size: Adult   Pulse: 74   Weight: 88 kg (194 lb)   Height: 5' 8" (1 727 m)       Physical Exam  Physical Exam    On examination the patient is in no acute distress    Gait normal   Affect normal   Digital rectal examination was offered and recommended and the patient defers as this was recently performed by Dr Wilson Quintero who noted a 30 g prostate without nodularity  Results  Lab Results   Component Value Date    PSA 3 4 11/27/2019    PSA 3 4 05/10/2019    PSA 2 6 11/22/2017     Lab Results   Component Value Date    CALCIUM 8 8 05/10/2019    K 4 5 05/10/2019    CO2 27 05/10/2019     05/10/2019    BUN 24 05/10/2019    CREATININE 1 26 05/10/2019     Lab Results   Component Value Date    WBC 6 89 05/10/2019    HGB 15 4 05/10/2019    HCT 47 7 05/10/2019    MCV 93 05/10/2019     05/10/2019         Office Urine Dip  No results found for this or any previous visit (from the past 1 hour(s))  ]      Total visit time was 25 minutes of which over 50% was spent on counseling

## 2019-12-06 ENCOUNTER — OFFICE VISIT (OUTPATIENT)
Dept: UROLOGY | Facility: MEDICAL CENTER | Age: 54
End: 2019-12-06
Payer: COMMERCIAL

## 2019-12-06 VITALS
HEIGHT: 68 IN | DIASTOLIC BLOOD PRESSURE: 84 MMHG | HEART RATE: 84 BPM | WEIGHT: 194 LBS | BODY MASS INDEX: 29.4 KG/M2 | SYSTOLIC BLOOD PRESSURE: 140 MMHG

## 2019-12-06 DIAGNOSIS — N40.0 ENLARGED PROSTATE ON RECTAL EXAMINATION: ICD-10-CM

## 2019-12-06 DIAGNOSIS — R97.20 INCREASED PROSTATE SPECIFIC ANTIGEN (PSA) VELOCITY: Primary | ICD-10-CM

## 2019-12-06 DIAGNOSIS — Z80.42 FAMILY HISTORY OF PROSTATE CANCER IN FATHER: ICD-10-CM

## 2019-12-06 LAB
SL AMB  POCT GLUCOSE, UA: NORMAL
SL AMB LEUKOCYTE ESTERASE,UA: NORMAL
SL AMB POCT BILIRUBIN,UA: NORMAL
SL AMB POCT BLOOD,UA: NORMAL
SL AMB POCT CLARITY,UA: CLEAR
SL AMB POCT COLOR,UA: YELLOW
SL AMB POCT KETONES,UA: NORMAL
SL AMB POCT NITRITE,UA: NORMAL
SL AMB POCT PH,UA: 5.5
SL AMB POCT SPECIFIC GRAVITY,UA: 1.02
SL AMB POCT URINE PROTEIN: NORMAL
SL AMB POCT UROBILINOGEN: 0.2

## 2019-12-06 PROCEDURE — 99215 OFFICE O/P EST HI 40 MIN: CPT | Performed by: UROLOGY

## 2019-12-06 PROCEDURE — 81003 URINALYSIS AUTO W/O SCOPE: CPT | Performed by: UROLOGY

## 2019-12-06 NOTE — LETTER
December 6, 2019     [address]    Patient: Dana Kunz   YOB: 1965   Date of Visit: 12/6/2019       Dear Dr Shalini Allen:    Thank you for referring Lorene Masterson to me for evaluation  Below are my notes for this consultation  If you have questions, please do not hesitate to call me  I look forward to following your patient along with you  Sincerely,        Pao Jacobsen MD        CC: MD Pao Joseph MD  12/6/2019  3:36 PM  Incomplete  Assessment/Plan:      Diagnoses and all orders for this visit:    Increased prostate specific antigen (PSA) velocity  -     POCT urine dip auto non-scope  -     PSA, total and free; Future    Family history of prostate cancer in father  -     PSA, total and free; Future    Enlarged prostate on rectal examination        Assessment/Plan:  Regarding his PSA of 2 6 dating back to 2017, as well as 2 recent repeat PSAs in 2019 of 3 4, we discussed his young age, strong family history of prostate cancer, and PSA velocity  His slight increase in PSA may be due to prostatic enlargement, but he is well aware that he may harbor an indolent yet to be diagnosed prostate cancer  We discussed options including a repeat PSA, free and total ratio, within the next 3 months versus proceeding with a transrectal ultrasound-guided biopsy of the prostate  The patient is interested in a more conservative option, of repeating his PSA in 3 months to see if there is a continued worrisome increase trend, with option of obtaining a multiparametric MRI could also be entertained at that point, either which could culminate a subsequent prostate biopsy       Patient ID: Dana Kunz is a 53 y o  male  HPI  53 y o  male with a history of a PSA of 2 6 dating back to 2017  In May 2019 the patient had a PSA of 3 4 and was referred to Dr Jeannine Raygoza    Follow-up was recommended in the next 3 months with a repeat PSA, which is unchanged at 3 4 from November 2019  He was recently seen by another my associates, Dr Alessandra Wheeler, and appropriate options and recommendations were made to patient  He denies any lower urinary tract symptoms or erectile dysfunction  He states that he has a elevated testosterone level, however, there are no testosterone levels available for my review at [facility]  He states that his father is 78years of age and healthy  In his late 62s he underwent open radical retropubic prostatectomy performed by me  Prior surgical history is remarkable for laparoscopic umbilical hernia repair  Review of Systems   Constitutional: Negative  HENT: Negative  Eyes: Negative  Respiratory: Negative  Cardiovascular: Negative  Gastrointestinal: Negative  Endocrine: Negative  Genitourinary: Negative  Negative for difficulty urinating  Musculoskeletal: Negative  Skin: Negative  Allergic/Immunologic: Negative  Neurological: Negative  Hematological: Negative  Psychiatric/Behavioral: Negative  Objective:     Physical Exam   Constitutional: He is oriented to person, place, and time  He appears well-developed and well-nourished  No distress  HENT:   Head: Normocephalic and atraumatic  Nose: Nose normal    Mouth/Throat: Oropharynx is clear and moist    Eyes: Pupils are equal, round, and reactive to light  Conjunctivae and EOM are normal  No scleral icterus  Neck: Normal range of motion  Neck supple  Cardiovascular: Normal rate, regular rhythm, normal heart sounds and intact distal pulses  No murmur heard  Pulmonary/Chest: Effort normal and breath sounds normal  No respiratory distress  He has no wheezes  He has no rales  Abdominal: Soft  Bowel sounds are normal  He exhibits no distension and no mass  There is no tenderness  Genitourinary: Prostate is enlarged  Prostate is not tender     Genitourinary Comments: Prostate exam:  2/4 enlargement, smooth with no tenderness, nodules or induration   Musculoskeletal: Normal range of motion  He exhibits no edema or tenderness  Lymphadenopathy:     He has no cervical adenopathy  Neurological: He is alert and oriented to person, place, and time  No cranial nerve deficit  Skin: Skin is warm and dry  No rash noted  No erythema  No pallor  Psychiatric: He has a normal mood and affect  His behavior is normal  Judgment and thought content normal    Nursing note and vitals reviewed        PSA Total, Diagnostic    Ref Range & Units 11/27/19  6:38 AM 5/10/19  7:00 AM 11/22/17  6:49 AM   PSA, Diagnostic 0 0 - 4 0 ng/mL 3 4  3 4 CM 2 6 CM

## 2019-12-06 NOTE — PROGRESS NOTES
Assessment/Plan:      Diagnoses and all orders for this visit:    Increased prostate specific antigen (PSA) velocity  -     POCT urine dip auto non-scope  -     PSA, total and free; Future    Family history of prostate cancer in father  -     PSA, total and free; Future    Enlarged prostate on rectal examination        Assessment/Plan:  Regarding his PSA of 2 6 dating back to 2017, as well as 2 recent repeat PSAs in 2019 of 3 4, we discussed his young age, strong family history of prostate cancer, and PSA velocity  His slight increase in PSA may be due to prostatic enlargement, but he is well aware that he may harbor an indolent yet to be diagnosed prostate cancer  We discussed options including a repeat PSA, free and total ratio, within the next 3 months versus proceeding with a transrectal ultrasound-guided biopsy of the prostate  The patient is interested in a more conservative option, of repeating his PSA in 3 months to see if there is a continued worrisome increase trend, with option of obtaining a multiparametric MRI could also be entertained at that point, either which could culminate a subsequent prostate biopsy       Patient ID: Kai Reyes is a 53 y o  male  HPI  53 y o  male with a history of a PSA of 2 6 dating back to 2017  In May 2019 the patient had a PSA of 3 4 and was referred to Dr Gilford Downs  Follow-up was recommended in the next 3 months with a repeat PSA, which is unchanged at 3 4 from November 2019  He was recently seen by another my associates, Dr Radha Calix, and appropriate options and recommendations were made to patient  He denies any lower urinary tract symptoms or erectile dysfunction  He states that he has a elevated testosterone level, however, there are no testosterone levels available for my review at [address] [place] [address]  He states that his father is 78years of age and healthy  In his late 62s he underwent open radical retropubic prostatectomy performed by me  Prior surgical history is remarkable for laparoscopic umbilical hernia repair  Review of Systems   Constitutional: Negative  HENT: Negative  Eyes: Negative  Respiratory: Negative  Cardiovascular: Negative  Gastrointestinal: Negative  Endocrine: Negative  Genitourinary: Negative  Negative for difficulty urinating  Musculoskeletal: Negative  Skin: Negative  Allergic/Immunologic: Negative  Neurological: Negative  Hematological: Negative  Psychiatric/Behavioral: Negative  Objective:     Physical Exam   Constitutional: He is oriented to person, place, and time  He appears well-developed and well-nourished  No distress  HENT:   Head: Normocephalic and atraumatic  Nose: Nose normal    Mouth/Throat: Oropharynx is clear and moist    Eyes: Pupils are equal, round, and reactive to light  Conjunctivae and EOM are normal  No scleral icterus  Neck: Normal range of motion  Neck supple  Cardiovascular: Normal rate, regular rhythm, normal heart sounds and intact distal pulses  No murmur heard  Pulmonary/Chest: Effort normal and breath sounds normal  No respiratory distress  He has no wheezes  He has no rales  Abdominal: Soft  Bowel sounds are normal  He exhibits no distension and no mass  There is no tenderness  Genitourinary: Prostate is enlarged  Prostate is not tender  Genitourinary Comments: Prostate exam:  2/4 enlargement, smooth with no tenderness, nodules or induration   Musculoskeletal: Normal range of motion  He exhibits no edema or tenderness  Lymphadenopathy:     He has no cervical adenopathy  Neurological: He is alert and oriented to person, place, and time  No cranial nerve deficit  Skin: Skin is warm and dry  No rash noted  No erythema  No pallor  Psychiatric: He has a normal mood and affect  His behavior is normal  Judgment and thought content normal    Nursing note and vitals reviewed        PSA Total, Diagnostic    Ref Range & Units 11/27/19  6:38 AM 5/10/19  7:00 AM 11/22/17  6:49 AM   PSA, Diagnostic 0 0 - 4 0 ng/mL 3 4  3 4 CM 2 6 CM

## 2020-01-08 ENCOUNTER — OFFICE VISIT (OUTPATIENT)
Dept: FAMILY MEDICINE CLINIC | Facility: CLINIC | Age: 55
End: 2020-01-08
Payer: COMMERCIAL

## 2020-01-08 VITALS
TEMPERATURE: 97.4 F | BODY MASS INDEX: 30.45 KG/M2 | DIASTOLIC BLOOD PRESSURE: 76 MMHG | RESPIRATION RATE: 12 BRPM | WEIGHT: 194 LBS | OXYGEN SATURATION: 97 % | HEIGHT: 67 IN | HEART RATE: 68 BPM | SYSTOLIC BLOOD PRESSURE: 120 MMHG

## 2020-01-08 DIAGNOSIS — R20.2 PARESTHESIA OF BOTH FEET: Primary | ICD-10-CM

## 2020-01-08 DIAGNOSIS — R09.89 DIMINISHED PULSES IN LOWER EXTREMITY: ICD-10-CM

## 2020-01-08 PROCEDURE — 1036F TOBACCO NON-USER: CPT | Performed by: FAMILY MEDICINE

## 2020-01-08 PROCEDURE — 3008F BODY MASS INDEX DOCD: CPT | Performed by: FAMILY MEDICINE

## 2020-01-08 PROCEDURE — 99214 OFFICE O/P EST MOD 30 MIN: CPT | Performed by: FAMILY MEDICINE

## 2020-01-08 NOTE — PROGRESS NOTES
Chief Complaint   Patient presents with    Tingling     Sensitivity to cold in feet     Health Maintenance   Topic Date Due    DTaP,Tdap,and Td Vaccines (1 - Tdap) 01/16/1975    HIV Screening  01/16/1979    Depression Screening PHQ  05/01/2020    BMI: Followup Plan  05/01/2020    BMI: Adult  12/06/2020    CRC Screening: Colonoscopy  11/12/2028    Pneumococcal Vaccine: 65+ Years (1 of 2 - PCV13) 01/16/2029    Hepatitis C Screening  Completed    Pneumococcal Vaccine: Pediatrics (0 to 5 Years) and At-Risk Patients (6 to 59 Years)  Aged Out    HIB Vaccine  Aged Out    Hepatitis B Vaccine  Aged Out    IPV Vaccine  Aged Out    Hepatitis A Vaccine  Aged Out    Meningococcal ACWY Vaccine  Aged Out    HPV Vaccine  Aged Out    Influenza Vaccine  Discontinued     Assessment/Plan:    Paresthesia of both feet  R/o neuropathy  Will check BMP, Hb A1c to rule out DM  Check Vit B12 level  Diminished pulses in lower extremity  Will order Arterial Doppler bilateral lower extremities to rule out peripheral vascular disease  Diagnoses and all orders for this visit:    Paresthesia of both feet  -     Basic metabolic panel; Future  -     Hemoglobin A1C; Future  -     Vitamin B12; Future    Diminished pulses in lower extremity  -     VAS lower limb arterial duplex, complete bilateral; Future          Subjective:      Patient ID: Yuly Shrestha is a 53 y o  male  HPI     Patient presents to the office c/o tingling, burning sensation in small toes BL feet for 1 month  Patient had blood test in May 2019  Fasting blood sugar was 109  LDL 87  No prior H/o DM  Patient denies leg cramps, pain in legs with walking  Denies tobacco use  He drinks alcohol 2-3 times per week  Also c/o feet feeling cold in the cold weather  Family history is positive for diabetes in maternal grandfather      The following portions of the patient's history were reviewed and updated as appropriate: allergies, current medications, past medical history, past social history, past surgical history and problem list     Review of Systems   Constitutional: Negative for activity change, appetite change, chills, fatigue and fever  Eyes: Negative for pain, discharge, redness, itching and visual disturbance  Respiratory: Negative for cough, chest tightness, shortness of breath and wheezing  Cardiovascular: Negative for chest pain, palpitations and leg swelling  Gastrointestinal: Negative for abdominal pain, constipation, diarrhea, nausea and vomiting  Genitourinary: Negative for difficulty urinating, dysuria, flank pain, frequency and hematuria  Musculoskeletal: Negative for arthralgias, back pain, joint swelling, myalgias and neck pain  Skin: Negative for rash and wound  Neurological: Positive for numbness (burning, tingling in feet )  Hematological: Negative  Psychiatric/Behavioral: Negative  Objective:      /76 (BP Location: Left arm, Patient Position: Sitting, Cuff Size: Adult)   Pulse 68   Temp (!) 97 4 °F (36 3 °C) (Tympanic)   Resp 12   Ht 5' 7" (1 702 m)   Wt 88 kg (194 lb)   SpO2 97%   BMI 30 38 kg/m²          Physical Exam   Constitutional: He appears well-developed and well-nourished  HENT:   Head: Normocephalic and atraumatic  Eyes: Pupils are equal, round, and reactive to light  Conjunctivae are normal    Cardiovascular: Normal rate, regular rhythm and normal heart sounds  No murmur heard  No BL LE edema  Dorsalis pulses 1+ BL   Pulmonary/Chest: Effort normal and breath sounds normal    Abdominal: Soft  Bowel sounds are normal  There is no tenderness  Musculoskeletal: Normal range of motion  He exhibits no edema, tenderness or deformity  Skin: Skin is warm and dry  No rash noted  Nursing note and vitals reviewed

## 2020-01-21 ENCOUNTER — APPOINTMENT (OUTPATIENT)
Dept: LAB | Age: 55
End: 2020-01-21
Payer: COMMERCIAL

## 2020-01-21 ENCOUNTER — TRANSCRIBE ORDERS (OUTPATIENT)
Dept: ADMINISTRATIVE | Age: 55
End: 2020-01-21

## 2020-01-21 DIAGNOSIS — R20.2 PARESTHESIA OF BOTH FEET: ICD-10-CM

## 2020-01-21 LAB
ANION GAP SERPL CALCULATED.3IONS-SCNC: 3 MMOL/L (ref 4–13)
BUN SERPL-MCNC: 23 MG/DL (ref 5–25)
CALCIUM SERPL-MCNC: 9.1 MG/DL (ref 8.3–10.1)
CHLORIDE SERPL-SCNC: 109 MMOL/L (ref 100–108)
CO2 SERPL-SCNC: 31 MMOL/L (ref 21–32)
CREAT SERPL-MCNC: 1.33 MG/DL (ref 0.6–1.3)
EST. AVERAGE GLUCOSE BLD GHB EST-MCNC: 105 MG/DL
GFR SERPL CREATININE-BSD FRML MDRD: 59 ML/MIN/1.73SQ M
GLUCOSE P FAST SERPL-MCNC: 96 MG/DL (ref 65–99)
HBA1C MFR BLD: 5.3 % (ref 4.2–6.3)
POTASSIUM SERPL-SCNC: 4.7 MMOL/L (ref 3.5–5.3)
SODIUM SERPL-SCNC: 143 MMOL/L (ref 136–145)
VIT B12 SERPL-MCNC: 402 PG/ML (ref 100–900)

## 2020-01-21 PROCEDURE — 82607 VITAMIN B-12: CPT

## 2020-01-21 PROCEDURE — 83036 HEMOGLOBIN GLYCOSYLATED A1C: CPT

## 2020-01-21 PROCEDURE — 80048 BASIC METABOLIC PNL TOTAL CA: CPT

## 2020-01-21 PROCEDURE — 36415 COLL VENOUS BLD VENIPUNCTURE: CPT

## 2020-01-23 ENCOUNTER — HOSPITAL ENCOUNTER (OUTPATIENT)
Dept: NON INVASIVE DIAGNOSTICS | Facility: CLINIC | Age: 55
Discharge: HOME | End: 2020-01-23
Payer: COMMERCIAL

## 2020-01-23 DIAGNOSIS — R09.89 DIMINISHED PULSES IN LOWER EXTREMITY: ICD-10-CM

## 2020-01-23 PROCEDURE — 93923 UPR/LXTR ART STDY 3+ LVLS: CPT

## 2020-01-23 PROCEDURE — 93923 UPR/LXTR ART STDY 3+ LVLS: CPT | Performed by: SURGERY

## 2020-01-29 ENCOUNTER — TELEPHONE (OUTPATIENT)
Dept: FAMILY MEDICINE CLINIC | Facility: CLINIC | Age: 55
End: 2020-01-29

## 2020-01-29 NOTE — TELEPHONE ENCOUNTER
Please call patient  Blood work showed normal fasting blood sugar, Vit B12 level, electrolytes  Hb A1c 5 3 -no evidence of diabetes  Kidney function test is borderline elevated at 1 33 ( N 0 60-1 30)  Recommend to increase fluid intake  If continues with numbness, tingling in feet recommend to come for re-evaluation

## 2020-02-27 ENCOUNTER — TELEPHONE (OUTPATIENT)
Dept: UROLOGY | Facility: MEDICAL CENTER | Age: 55
End: 2020-02-27

## 2020-02-27 NOTE — TELEPHONE ENCOUNTER
Call placed to patient and informed him of Dr Raissa Johnson recommendations for Urologist at [facility]  Patient very appreciative and would like to cancel appointment on 3/6/2020

## 2020-02-27 NOTE — TELEPHONE ENCOUNTER
FYI - NO ACTION REQUIRED    LM to obtain patients new insurance information  He now has [address] and his coverage will need to be updated under registration      Provider: [address]  Plan: My [employer] ( [place])  Group: [id number]  Member ID# [id number]  Subscriber: Self  [facility] [address] Phone# [phone]  Location ([address] [place] [address]): [place], [place]

## 2020-02-27 NOTE — TELEPHONE ENCOUNTER
Patients insurance is non-par  Asking for a recommendation by Dr Jake Kruse for a physician at [facility]  Patient asked to keep the appointment for 3/6 on the board until he decides how he would like to proceed  Advised of self pay option  He can be reached [phone]

## 2020-03-13 ENCOUNTER — TELEPHONE (OUTPATIENT)
Dept: UROLOGY | Facility: AMBULATORY SURGERY CENTER | Age: 55
End: 2020-03-13

## 2020-03-13 NOTE — TELEPHONE ENCOUNTER
Patient managed by Annmarie Culver is calling for his medical records  Emailed release of info to [email or web address]

## 2020-03-18 NOTE — TELEPHONE ENCOUNTER
Received signed authorization from the patient dated 3/17/2020 to release information to [facility], Dr Rosalinda Farmer  Per patient request, Medical Records were faxed to [facility] on 3/18/2020

## 2020-06-30 ENCOUNTER — TELEPHONE (OUTPATIENT)
Dept: UROLOGY | Facility: MEDICAL CENTER | Age: 55
End: 2020-06-30

## 2020-07-08 ENCOUNTER — TELEMEDICINE (OUTPATIENT)
Dept: UROLOGY | Facility: MEDICAL CENTER | Age: 55
End: 2020-07-08
Payer: COMMERCIAL

## 2020-07-08 DIAGNOSIS — R97.20 INCREASED PROSTATE SPECIFIC ANTIGEN (PSA) VELOCITY: Primary | ICD-10-CM

## 2020-07-08 DIAGNOSIS — N40.0 ENLARGED PROSTATE ON RECTAL EXAMINATION: ICD-10-CM

## 2020-07-08 DIAGNOSIS — Z80.42 FAMILY HISTORY OF PROSTATE CANCER IN FATHER: ICD-10-CM

## 2020-07-08 DIAGNOSIS — R97.20 ELEVATED PSA: ICD-10-CM

## 2020-07-08 PROCEDURE — 99213 OFFICE O/P EST LOW 20 MIN: CPT | Performed by: UROLOGY

## 2020-07-08 PROCEDURE — 1036F TOBACCO NON-USER: CPT | Performed by: UROLOGY

## 2020-07-08 NOTE — PROGRESS NOTES
Virtual Regular Visit      Assessment/Plan:    Problem List Items Addressed This Visit     None      Visit Diagnoses     Increased prostate specific antigen (PSA) velocity    -  Primary    Relevant Orders    MRI prostate multiparametric wo w contrast    Basic metabolic panel    Family history of prostate cancer in father        Enlarged prostate on rectal examination        Elevated PSA        Relevant Orders    MRI prostate multiparametric wo w contrast    Basic metabolic panel               Reason for visit is   Chief Complaint   Patient presents with    Virtual Regular Visit        Encounter provider Rick Sifuentes MD    Provider located at [address] [place] [id number]      Recent Visits  No visits were found meeting these conditions  Showing recent visits within past 7 days and meeting all other requirements     Today's Visits  Date Type Provider Dept   07/08/20 Telemedicine Rick Sifuentes MD [facility] [place]   Showing today's visits and meeting all other requirements     Future Appointments  No visits were found meeting these conditions  Showing future appointments within next 150 days and meeting all other requirements        The patient was identified by name and date of birth  Link Guardian was informed that this is a telemedicine visit and that the visit is being conducted through telephone  My office door was closed  No one else was in the room  He acknowledged consent and understanding of privacy and security of the video platform  The patient has agreed to participate and understands they can discontinue the visit at any time  It was my intent to perform this visit via video technology but the patient was not able to do a video connection so the visit was completed via audio telephone only  Patient is aware this is a billable service       Assessment/Plan:  Regarding his PSA of 2 6 dating back to 2017, as well as 2 recent repeat PSAs in 2019 of 3 4, we discussed his young age, strong family history of prostate cancer, and PSA velocity  His slight increase in PSA may be due to prostatic enlargement, but he is well aware that he may harbor an indolent yet to be diagnosed prostate cancer    We discussed options including a repeat PSA, free and total ratio, within the next 3 months versus proceeding with a transrectal ultrasound-guided biopsy of the prostate   The patient is interested in a more conservative option, of repeating his PSA in 3 months to see if there is a continued worrisome increase trend, with option of obtaining a multiparametric MRI could also be entertained at that point, either which could culminate a subsequent prostate biopsy         Patient ID: Lizandro Campbell is a 53 y o  male      HPI  53 y  o  male with a history of a PSA of 2 6 dating back to 2017  In May 2019 the patient had a PSA of 3 4 and was referred to Dr Randy Mello   Follow-up was recommended in the next 3 months with a repeat PSA, which is unchanged at 3 4 from November 2019  He was recently seen by another my associates, Dr James Hutchins, and appropriate options and recommendations were made to patient      He denies any lower urinary tract symptoms or erectile dysfunction  Agnes Luna states that he has a elevated testosterone level, however, there are no testosterone levels available for my review at [facility]        He states that his father is 78years of age and healthy  Hanane Almeida his late 62s he underwent open radical retropubic prostatectomy performed by me       Past Medical History:   Diagnosis Date    Obesity        Past Surgical History:   Procedure Laterality Date    ABDOMINAL HERNIA REPAIR N/A 5/30/2019    Procedure: REPAIR HERNIA EPIGASTRIC w/ mesh;  Surgeon: Karina Bell MD;  [facility];  Service: General    NO PAST SURGERIES      [place] REPAIR UMBILICAL VTQS,7+U/J,BBKMQ N/A 5/30/2019 Procedure: REPAIR HERNIA UMBILICAL;  Surgeon: Ender Thomason MD;  [facility];  Service: General       No current outpatient medications on file  No current facility-administered medications for this visit  No Known Allergies    Review of Systems   Constitutional: Negative  HENT: Negative  Eyes: Negative  Respiratory: Negative  Cardiovascular: Negative  Gastrointestinal: Negative  Endocrine: Negative  Genitourinary: Negative  Musculoskeletal: Negative  Skin: Negative  Allergic/Immunologic: Negative  Neurological: Negative  Hematological: Negative  Psychiatric/Behavioral: Negative  Video Exam    There were no vitals filed for this visit  Physical Exam   Constitutional: He is oriented to person, place, and time  No distress  Neurological: He is alert and oriented to person, place, and time  Psychiatric: He has a normal mood and affect  His behavior is normal  Thought content normal         Contains abnormal data PSA, TOTAL (SCREENING)    Ref Range & Units 6/5/20 10:37 AM   PSA, Total <4 00 ng/mL 4  79High           I spent 20 minutes directly with the patient during this visit      [facility] acknowledges that he has consented to an online visit or consultation  He understands that the online visit is based solely on information provided by him, and that, in the absence of a face-to-face physical evaluation by the physician, the diagnosis he receives is both limited and provisional in terms of accuracy and completeness  This is not intended to replace a full medical face-to-face evaluation by the physician  Juan Miguel Mendoza understands and accepts these terms

## 2022-08-17 ENCOUNTER — OFFICE VISIT (OUTPATIENT)
Dept: UROLOGY | Facility: AMBULATORY SURGERY CENTER | Age: 57
End: 2022-08-17
Payer: COMMERCIAL

## 2022-08-17 VITALS
BODY MASS INDEX: 27.43 KG/M2 | OXYGEN SATURATION: 98 % | HEIGHT: 68 IN | SYSTOLIC BLOOD PRESSURE: 126 MMHG | WEIGHT: 181 LBS | HEART RATE: 74 BPM | DIASTOLIC BLOOD PRESSURE: 80 MMHG

## 2022-08-17 DIAGNOSIS — R97.20 RISING PSA LEVEL: Primary | ICD-10-CM

## 2022-08-17 PROCEDURE — 99214 OFFICE O/P EST MOD 30 MIN: CPT | Performed by: UROLOGY

## 2022-08-17 NOTE — ASSESSMENT & PLAN NOTE
We discussed options for his elevated PSA  This include observation versus repeat MRI versus prostate biopsy  I favor repeating MRI  If this shows a concerning lesion plan for fusion biopsy  If it does not would still consider in office transrectal biopsy given slowly rising PSA and family history

## 2022-08-17 NOTE — PROGRESS NOTES
Assessment/Plan:    Rising PSA level  We discussed options for his elevated PSA  This include observation versus repeat MRI versus prostate biopsy  I favor repeating MRI  If this shows a concerning lesion plan for fusion biopsy  If it does not would still consider in office transrectal biopsy given slowly rising PSA and family history  Subjective:      Patient ID: Lizandro Campbell is a 61 y o  male  HPI    53 y  o  male with a history of elevated PSA  In May 2019 the patient had a PSA of 3 4 and was referred to Dr Randy Mello   FU PSA 3 4 from November 2019   In the interval his PSA jose raul to 4 7 in May 2021 and 5 5 in November 2021 and then was 5 17 in January 2022 and rechecked in July and was 6 1  He had prostate MRI in 2020 at [facility] which did not show any concerning lesions  He denies any lower urinary tract symptoms or erectile dysfunction   He states that he has a elevated testosterone level, however, there are no testosterone levels available for my review at [facility]        He states that his father is 78years of age and healthy (with hx of prostate cancer and RRP in his 62s)       Past Surgical History:   Procedure Laterality Date    ABDOMINAL HERNIA REPAIR N/A 5/30/2019    Procedure: REPAIR HERNIA EPIGASTRIC w/ mesh;  Surgeon: Karina Bell MD;  [facility];  Service: General    NO PAST SURGERIES      HI REPAIR UMBILICAL ONQT,9+X/J,AZWXL N/A 5/30/2019    Procedure: REPAIR HERNIA UMBILICAL;  Surgeon: Karina Bell MD;  [facility];  Service: General        Past Medical History:   Diagnosis Date    Obesity              Review of Systems   Constitutional: Negative for chills and fever  HENT: Negative for ear pain and sore throat  Eyes: Negative for pain and visual disturbance  Respiratory: Negative for cough and shortness of breath  Cardiovascular: Negative for chest pain and palpitations     Gastrointestinal: Negative for abdominal pain and vomiting  Genitourinary: Negative for dysuria and hematuria  Musculoskeletal: Negative for arthralgias and back pain  Skin: Negative for color change and rash  Neurological: Negative for seizures and syncope  All other systems reviewed and are negative  Objective:      /80   Pulse 74   Ht 5' 8" (1 727 m)   Wt 82 1 kg (181 lb)   SpO2 98%   BMI 27 52 kg/m²     Lab Results   Component Value Date    PSA 3 4 11/27/2019    PSA 3 4 05/10/2019    PSA 2 6 11/22/2017          Physical Exam  Constitutional:       Appearance: Normal appearance  HENT:      Head: Normocephalic and atraumatic  Eyes:      Extraocular Movements: Extraocular movements intact  Pupils: Pupils are equal, round, and reactive to light  Cardiovascular:      Rate and Rhythm: Normal rate  Abdominal:      General: Abdomen is flat  There is no distension  Palpations: There is no mass  Tenderness: There is no abdominal tenderness  There is no right CVA tenderness, left CVA tenderness or guarding  Genitourinary:     Comments: ASIM showed 40 g T1c  Musculoskeletal:      Right lower leg: No edema  Left lower leg: No edema  Skin:     General: Skin is warm and dry  Coloration: Skin is not jaundiced  Findings: No bruising  Neurological:      General: No focal deficit present  Mental Status: He is alert and oriented to person, place, and time  Mental status is at baseline  Psychiatric:         Mood and Affect: Mood normal          Thought Content: Thought content normal          Judgment: Judgment normal            Orders  Orders Placed This Encounter   Procedures    MRI prostate multiparametric wo w contrast     Standing Status:   Future     Standing Expiration Date:   8/17/2026     Scheduling Instructions: There is no preparation for this test  Please leave your jewelry and valuables at home, wedding rings are the exception   Magnetic nail polish must be removed prior to arrival for your test  Please bring your insurance cards, a form of photo ID and a list of your medications with you  Arrive 15 minutes prior to your appointment time in order to register  Please bring any prior CT or MRI studies of this area that were not performed at a [facility]  To schedule this appointment, please contact Central Scheduling at [phone]  Prior to your appointment, please make sure you complete the MRI Screening Form when you e-Check in for your appointment  This will be available starting 7 days before your appointment in [address]  You may receive an e-mail with an activation code if you do not have a [employer] account  If you do not have access to a device, we will complete your screening at your appointment  Order Specific Question:   Release to patient through [employer] works     Answer:   Immediate     Order Specific Question:   Is order priority selected as STAT? Answer:   No     Order Specific Question:   Reason for Exam (FREE TEXT)     Answer:   pt with rising PSA  Prostate MRI at [facility] in 2020 unremarkable  Order Specific Question:   Reason for Exam:     Answer:   pt with rising PSA  Prostate  MRI at [facility] in 2020 unremarkable  Order Specific Question:   What is the patient's sedation requirement? Answer:   No Sedation     Order Specific Question:   Does the patient have metallic implants? Answer:    No

## 2022-09-30 ENCOUNTER — HOSPITAL ENCOUNTER (OUTPATIENT)
Dept: RADIOLOGY | Age: 57
Discharge: HOME | End: 2022-09-30
Payer: COMMERCIAL

## 2022-09-30 DIAGNOSIS — R97.20 RISING PSA LEVEL: ICD-10-CM

## 2022-09-30 PROCEDURE — 76377 3D RENDER W/INTRP POSTPROCES: CPT

## 2022-09-30 PROCEDURE — A9585 GADOBUTROL INJECTION: HCPCS | Performed by: UROLOGY

## 2022-09-30 PROCEDURE — 72197 MRI PELVIS W/O & W/DYE: CPT

## 2022-09-30 PROCEDURE — G1004 CDSM NDSC: HCPCS

## 2022-09-30 RX ADMIN — GADOBUTROL 8 ML: 604.72 INJECTION INTRAVENOUS at 12:48

## 2022-10-13 ENCOUNTER — TELEPHONE (OUTPATIENT)
Dept: UROLOGY | Facility: AMBULATORY SURGERY CENTER | Age: 57
End: 2022-10-13

## 2022-10-13 DIAGNOSIS — R97.20 ELEVATED PSA: Primary | ICD-10-CM

## 2022-10-13 NOTE — TELEPHONE ENCOUNTER
Spoke to pt and advised of Dr Amalia Newman note below:  Pt is asking for a testosterone order to be placed in his chart with the PSA order      Seda Sharma MD   10/11/2022  8:42 AM EDT           MRI is not concerning but with rising PSA I think we should repeat a PSA now since the last was in July 2022 and if it is still going up perform an in office biopsy

## 2022-10-13 NOTE — TELEPHONE ENCOUNTER
NOB  PMH: neg, no hx of high BP  PSH: breast right fibroadenoma removed  PAP normal 2020, declined [address] at [facility]  Will get BP cuff, monitor BP at home  Prenatal care and course discussed with patient including ultrasounds, genetic mague Pt returning call and can be reached now at [phone]

## 2022-10-13 NOTE — TELEPHONE ENCOUNTER
Pt had an appointment today 10/13 and he missed it and wants to know if someone could call him and go over this MRI results or if he needs to reschedule       Pt can be reached at [phone]

## 2022-10-13 NOTE — TELEPHONE ENCOUNTER
Shobha Guido MD   10/11/2022  8:42 AM EDT         MRI is not concerning but with rising PSA I think we should repeat a PSA now since the last was in July 2022 and if it is still going up perform an in office biopsy

## 2022-10-13 NOTE — TELEPHONE ENCOUNTER
PSA ordered, will call with his results  Is he experiencing symptoms of low testosterone, otherwise testosterone level is not needed at this time

## 2022-10-14 NOTE — TELEPHONE ENCOUNTER
LM per communication consent with AP's note  He is to call back if having low testosterone symptoms  If patient calls back with symptoms of low testosterone, please ask him to describe what symptoms he is having

## 2022-10-18 NOTE — TELEPHONE ENCOUNTER
LM for patient that testosterone and CBC were ordered  Advised if he is not currently being supplemented with Testosterone he needs to go between 7-9am ONLY and if he is on treatment then anytime  If patient calls back, ask what symptoms he is having in regards to needing testosterone level

## 2022-10-27 ENCOUNTER — APPOINTMENT (OUTPATIENT)
Dept: LAB | Age: 57
End: 2022-10-27
Payer: COMMERCIAL

## 2022-10-27 DIAGNOSIS — R97.20 ELEVATED PSA: ICD-10-CM

## 2022-10-27 DIAGNOSIS — E29.1 HYPOGONADISM IN MALE: ICD-10-CM

## 2022-10-27 LAB
BASOPHILS # BLD AUTO: 0.03 THOUSANDS/ÂΜL (ref 0–0.1)
BASOPHILS NFR BLD AUTO: 1 % (ref 0–1)
EOSINOPHIL # BLD AUTO: 0.04 THOUSAND/ÂΜL (ref 0–0.61)
EOSINOPHIL NFR BLD AUTO: 1 % (ref 0–6)
ERYTHROCYTE [DISTWIDTH] IN BLOOD BY AUTOMATED COUNT: 12.1 % (ref 11.6–15.1)
HCT VFR BLD AUTO: 46.3 % (ref 36.5–49.3)
HGB BLD-MCNC: 14.8 G/DL (ref 12–17)
IMM GRANULOCYTES # BLD AUTO: 0.01 THOUSAND/UL (ref 0–0.2)
IMM GRANULOCYTES NFR BLD AUTO: 0 % (ref 0–2)
LYMPHOCYTES # BLD AUTO: 1.01 THOUSANDS/ÂΜL (ref 0.6–4.47)
LYMPHOCYTES NFR BLD AUTO: 18 % (ref 14–44)
MCH RBC QN AUTO: 30.1 PG (ref 26.8–34.3)
MCHC RBC AUTO-ENTMCNC: 32 G/DL (ref 31.4–37.4)
MCV RBC AUTO: 94 FL (ref 82–98)
MONOCYTES # BLD AUTO: 0.29 THOUSAND/ÂΜL (ref 0.17–1.22)
MONOCYTES NFR BLD AUTO: 5 % (ref 4–12)
NEUTROPHILS # BLD AUTO: 4.1 THOUSANDS/ÂΜL (ref 1.85–7.62)
NEUTS SEG NFR BLD AUTO: 75 % (ref 43–75)
NRBC BLD AUTO-RTO: 0 /100 WBCS
PLATELET # BLD AUTO: 214 THOUSANDS/UL (ref 149–390)
PMV BLD AUTO: 10.3 FL (ref 8.9–12.7)
PSA SERPL-MCNC: 3.9 NG/ML (ref 0–4)
RBC # BLD AUTO: 4.92 MILLION/UL (ref 3.88–5.62)
WBC # BLD AUTO: 5.48 THOUSAND/UL (ref 4.31–10.16)

## 2022-10-27 PROCEDURE — 84403 ASSAY OF TOTAL TESTOSTERONE: CPT

## 2022-10-27 PROCEDURE — G0103 PSA SCREENING: HCPCS

## 2022-10-27 PROCEDURE — 84402 ASSAY OF FREE TESTOSTERONE: CPT

## 2022-10-27 PROCEDURE — 36415 COLL VENOUS BLD VENIPUNCTURE: CPT

## 2022-10-27 PROCEDURE — 85025 COMPLETE CBC W/AUTO DIFF WBC: CPT

## 2022-10-28 LAB
TESTOST FREE SERPL-MCNC: 8.6 PG/ML (ref 7.2–24)
TESTOST SERPL-MCNC: 574 NG/DL (ref 264–916)

## 2022-11-02 ENCOUNTER — TELEPHONE (OUTPATIENT)
Dept: OTHER | Facility: OTHER | Age: 57
End: 2022-11-02

## 2022-11-02 NOTE — TELEPHONE ENCOUNTER
Patient called in requesting results for his testosterone test done on 10/27  Please call patient back to discuss  Patient stated he is available from 1130 to 1230 today or anytime after 1530  Otherwise, he said you may leave him a detailed voicemail

## 2022-11-02 NOTE — TELEPHONE ENCOUNTER
Please inform patient results of lab work were within normal limits, PSA 3 9 and testosterone 574  Next PSA and follow-up due in 6 months

## 2024-02-21 PROBLEM — Z00.00 WELL ADULT EXAM: Status: RESOLVED | Noted: 2019-05-01 | Resolved: 2024-02-21

## 2025-05-14 ENCOUNTER — OFFICE VISIT (OUTPATIENT)
Dept: FAMILY MEDICINE CLINIC | Facility: CLINIC | Age: 60
End: 2025-05-14
Payer: COMMERCIAL

## 2025-05-14 VITALS
SYSTOLIC BLOOD PRESSURE: 138 MMHG | HEART RATE: 62 BPM | OXYGEN SATURATION: 98 % | DIASTOLIC BLOOD PRESSURE: 78 MMHG | WEIGHT: 178.9 LBS | BODY MASS INDEX: 27.11 KG/M2 | TEMPERATURE: 97.8 F | HEIGHT: 68 IN | RESPIRATION RATE: 18 BRPM

## 2025-05-14 DIAGNOSIS — R03.0 ELEVATED BLOOD PRESSURE READING: ICD-10-CM

## 2025-05-14 DIAGNOSIS — R97.20 ELEVATED PSA: ICD-10-CM

## 2025-05-14 DIAGNOSIS — Z80.42 FAMILY HISTORY OF PROSTATE CANCER IN FATHER: ICD-10-CM

## 2025-05-14 DIAGNOSIS — R79.89 LOW TESTOSTERONE IN MALE: ICD-10-CM

## 2025-05-14 DIAGNOSIS — N18.31 STAGE 3A CHRONIC KIDNEY DISEASE (HCC): Primary | ICD-10-CM

## 2025-05-14 DIAGNOSIS — Z12.5 PROSTATE CANCER SCREENING: ICD-10-CM

## 2025-05-14 DIAGNOSIS — Z13.1 SCREENING FOR DIABETES MELLITUS: ICD-10-CM

## 2025-05-14 DIAGNOSIS — Z13.6 SCREENING FOR CARDIOVASCULAR CONDITION: ICD-10-CM

## 2025-05-14 PROBLEM — N18.30 CKD (CHRONIC KIDNEY DISEASE) STAGE 3, GFR 30-59 ML/MIN (HCC): Status: ACTIVE | Noted: 2020-03-12

## 2025-05-14 PROCEDURE — 99386 PREV VISIT NEW AGE 40-64: CPT | Performed by: NURSE PRACTITIONER

## 2025-05-14 NOTE — PATIENT INSTRUCTIONS
"Patient Education     DASH diet   The Basics   Written by the doctors and editors at [facility]   What is the DASH diet? -- DASH stands for \"dietary approaches to stop hypertension.\" It is an eating plan that can help lower blood pressure. It can also help prevent high blood pressure, which doctors call \"hypertension.\" You don't need special foods or recipes to follow the DASH diet. It is more about eating certain types of foods in certain amounts.  The DASH diet has lots of fruits and vegetables, whole grains, lean meats, healthy fats, and low-fat or fat-free dairy products (figure 1). It is low in saturated fats, trans fats, cholesterol, added sugars, and sodium (salt).  The standard DASH diet limits sodium to no more than 2300 mg a day. Your doctor or nurse can talk to you about what your specific goals should be.  Why do I need the DASH diet? -- The DASH diet can help you:   Lower your blood pressure and cholesterol   Lower your risk for cancer, heart disease, heart attack, and stroke. It might also lower your risk for heart failure, kidney stones, and diabetes.   Lose weight or keep a healthy weight  What can I eat and drink on the DASH diet? -- Below are some guidelines and examples for your daily and weekly nutrition goals. These are based on a 2000-calorie-per-day eating plan.  Daily goals:   Grains - Try to eat 6 to 8 servings of whole-grain, high-fiber foods each day. Examples of a serving include 1 slice of bread, 1 ounce (30 grams) of dry cereal, or 1/2 cup (120 grams) of cooked cereal, pasta, or brown rice.   Fruits - Try to eat 4 to 5 servings of fruit each day. Examples of a serving include 1 medium fruit or 1/2 cup (75 grams) of fresh, frozen, or canned fruit. Try to eat different kinds and colors. Frozen or canned fruit should not have added sugar. Look for frozen or canned fruits with 100 percent fruit juice or water.   Vegetables - Try to eat 4 to 5 servings of vegetables each day. Examples of a " "serving include 1 cup (40 grams) of leafy greens or 1/2 cup (75 grams) of fresh or cooked vegetables. Try to pick many kinds and colors. If you buy canned vegetables, look for \"low sodium\" or \"salt free.\" Buy plain, frozen vegetables to avoid added fat and sodium.   Dairy - Try to eat 2 to 3 servings of fat-free or low-fat milk products each day. Examples of a serving include 1 cup (240 mL) of milk or yogurt or 1.5 ounces (45 grams) of cheese.   Lean meats, poultry, and seafood - Try to eat 6 or fewer servings of lean meat, poultry, and seafood each day. Examples of a serving include 1 egg or 1 ounce (30 grams) of cooked meat, poultry, or fish. Try to choose more low-fat or lean meats like chicken, fish, or turkey. Eat less red meat.   Fats and oils - Try to eat 2 to 3 servings of fats and oils each day. Examples of a serving include 1 teaspoon (5 mL) of soft margarine or vegetable oil, or 1 tablespoon (18 grams) of mayonnaise. Eat healthy fats like those found in fish, nuts, and avocados. Try using olive oil or vegetable oils such as canola oil. You can also try corn, safflower, sunflower, or soybean oils. Use low-sodium and low-fat salad dressing and mayonnaise.  Weekly goals:   Nuts, seeds, and legumes (dry beans and peas) - Try to eat 4 to 5 servings each week. Examples of a serving include 1/3 cup (45 grams) of nuts, 2 tablespoons (50 grams) of nut butter or seeds, or 1/2 cup (75 grams) of cooked legumes. Try almonds and walnuts, sunflower seeds, peanut or other nut butters, soybeans, lentils, kidney beans, and split peas.   Sweets - Try to eat fewer than 5 servings each week. Examples of a serving include 1 tablespoon (14 grams) of sugar or jelly, or 1/2 cup (120 grams) of gelatin. Choose low-fat and trans fat-free desserts. These include fruit-flavored gelatin, sorbet, jellybeans, abram crackers, animal crackers, low-fat fig bars, and yvette snaps. Eat fruit to satisfy the desire for sweets.  To add flavor, " use pepper, herbs, spices, vinegar, or lemon or lime juices. Choose low-sodium or salt-free products whenever you can. This is especially important for foods like broths, soups, or soy sauce.  What foods and drinks should I avoid on the DASH diet?    Grains to avoid - Salted breads, rolls, crackers, quick breads, self-rising flours, biscuit mixes, regular breadcrumbs, instant hot cereals, commercially prepared rice, pasta, stuffing mixes.   Fruits and vegetables to avoid - Store-bought prepared potatoes and vegetable mixes, regular canned vegetables and juices, vegetables frozen with sauce, pickled vegetables, processed fruits with salt or sodium.   Dairy products to avoid - Whole milk, malted milk, chocolate milk, buttermilk, full-fat cheese, ice cream.   Meats to avoid - Smoked, cured, salted, or canned fish such as sardines or anchovies. High-fat cuts of meat like beef, lamb, pork, cannon and sausage, and chicken with the skin on it.   Fats and oils to avoid - Eat fewer solid fats like butter, lard, and hard stick margarine. Eat less saturated fat, trans fat, and total fat.   Condiments and snacks to avoid - Salted and canned peas, beans, and olives. Salted snack foods, fried foods, soda, other sweetened drinks.   Sweets to avoid - High-fat baked goods such as muffins, donuts, pastries, and commercial baked goods. Candy bars.   Alcohol - If you choose to drink alcohol, limit the amount. Most doctors recommend limiting alcohol to no more than 1 drink a day (for females) or 2 drinks a day (for males).  What else do I need to know?    Get regular physical activity to make this diet help you even more. Even gentle forms of activity, like walking, are good for your health.   Try baking or broiling instead of frying foods.   Write down the foods that you eat. This will help you track what you have eaten each week.   When you go to the grocery store, have a list or a meal plan. Don't shop when you are hungry, since this  might lead you to buy more unhealthy foods.   Read food labels with care (figure 2). They show you how much is in a serving. The amount is given as a percentage of the total amount that you need each day. Reading labels helps you make healthy food choices.  All topics are updated as new evidence becomes available and our peer review process is complete.  This topic retrieved from [employer] on: Feb 26, 2024.  Topic 685511 Version 1.0  Release: 32.2.4 - C32.56  © 2024 UpToDate, Inc. and/or its affiliates. All rights reserved.  figure 1: DASH diet     Graphic 809454 Version 1.0  figure 2: Food label     Graphic 618926 Version 1.0  Consumer Information Use and Disclaimer   Disclaimer: This generalized information is a limited summary of diagnosis, treatment, and/or medication information. It is not meant to be comprehensive and should be used as a tool to help the user understand and/or assess potential diagnostic and treatment options. It does NOT include all information about conditions, treatments, medications, side effects, or risks that may apply to a specific patient. It is not intended to be medical advice or a substitute for the medical advice, diagnosis, or treatment of a health care provider based on the health care provider's examination and assessment of a patient's specific and unique circumstances. Patients must speak with a health care provider for complete information about their health, medical questions, and treatment options, including any risks or benefits regarding use of medications. This information does not endorse any treatments or medications as safe, effective, or approved for treating a specific patient. UpToDate, Inc. and its affiliates disclaim any warranty or liability relating to this information or the use thereof.The use of this information is governed by the Terms of Use, available at https://www.woltersFederal Financeuwer.com/en/know/clinical-effectiveness-terms. 2024© UpToDate, Inc. and its  affiliates and/or licensors. All rights reserved.  Copyright   © 2024 [employer] and/or its affiliates. All rights reserved.

## 2025-05-14 NOTE — ASSESSMENT & PLAN NOTE
Lab Results   Component Value Date    EGFR 59 01/21/2020    EGFR 64 05/10/2019    EGFR 50 (L) 12/21/2018    CREATININE 1.21 01/14/2022    CREATININE 1.33 (H) 01/21/2020    CREATININE 1.26 05/10/2019   Surveillance blood work ordered.  Refrain from added salt

## 2025-05-14 NOTE — PROGRESS NOTES
Name: Ward Calderón      : 1965      MRN: [id number]  Encounter Provider: TALITA Guzman  Encounter Date: 2025   Encounter department: [facility]    Assessment & Plan  Screening for diabetes mellitus    Orders:    Hemoglobin A1C; Future    Screening for cardiovascular condition    Orders:    Lipid Panel with Direct LDL reflex; Future    Prostate cancer screening    Orders:    PSA, Total Screen; Future    BMI 27.0-27.9,adult    Orders:    CBC and differential; Future    Comprehensive metabolic panel; Future    Low testosterone in male    Orders:    Testosterone, free, total; Future    Stage 3a chronic kidney disease (HCC)  Lab Results   Component Value Date    EGFR 59 2020    EGFR 64 05/10/2019    EGFR 50 (L) 2018    CREATININE 1.21 2022    CREATININE 1.33 (H) 2020    CREATININE 1.26 05/10/2019   Surveillance blood work ordered.  Refrain from added salt         Elevated PSA  Lab Results   Component Value Date    PSA 3.9 10/27/2022    PSA 3.4 2019    PSA 3.4 05/10/2019     Patient with a history of elevation of his PSA.  He was following urology in the past.  He does have a family history of prostate cancer in his father.  PSA ordered.         Family history of prostate cancer in father  PSA ordered.         Elevated blood pressure reading  Blood pressure in the office today is 138/78.  Information regarding the DASH diet provided to the patient.  Continued lifestyle modifications.           Depression Screening and Follow-up Plan: Patient was screened for depression during today's encounter. They screened negative with a PHQ-2 score of 0.          History of Present Illness     Patient presents to the office today to reestablish care.  Past medical history updated.  He does exercise 6 to 7 days/week.  He is eating a healthy diet.  He does not take vaccines.  He has no problems with bowel movements.  He is not up-to-date with vaccines and does not  "wish to obtain any.  He is due for surveillance blood work.          Review of Systems   Constitutional: Negative.  Negative for fatigue.   HENT: Negative.  Negative for congestion, postnasal drip, rhinorrhea and trouble swallowing.    Eyes: Negative.  Negative for visual disturbance.   Respiratory: Negative.  Negative for choking and shortness of breath.    Cardiovascular: Negative.  Negative for chest pain.   Gastrointestinal: Negative.    Endocrine: Negative.    Genitourinary: Negative.    Musculoskeletal: Negative.  Negative for arthralgias, back pain, myalgias and neck pain.   Skin: Negative.    Neurological:  Negative for dizziness and headaches.   Psychiatric/Behavioral: Negative.       Past Medical History:   Diagnosis Date    Obesity      Past Surgical History:   Procedure Laterality Date    ABDOMINAL HERNIA REPAIR N/A 5/30/2019    Procedure: REPAIR HERNIA EPIGASTRIC w/ mesh;  Surgeon: Axel Basurto MD;  Location: [facility];  Service: General    NO PAST SURGERIES      WI RPR UMBILICAL HRNA 5 YRS/> REDUCIBLE N/A 5/30/2019    Procedure: REPAIR HERNIA UMBILICAL;  Surgeon: Axel Basurto MD;  Location: [facility];  Service: General     Family History   Problem Relation Age of Onset    Schizophrenia Mother     Cerebral aneurysm Father     Coronary artery disease Father     Prostate cancer Father     Heart attack Father     No Known Problems Sister     Diabetes Maternal Grandfather      Social History     Tobacco Use    Smoking status: Never    Smokeless tobacco: Never   Vaping Use    Vaping status: Never Used   Substance and Sexual Activity    Alcohol use: Yes     Comment: Social 1-2 a month    Drug use: Never    Sexual activity: Yes     Partners: Female     Medications[1]  No Known Allergies  Immunization History   Administered Date(s) Administered    COVID-19 MODERNA VACC 0.5 ML IM 08/12/2021     Objective   /78   Pulse 62   Temp 97.8 °F (36.6 °C) (Tympanic)   Resp 18   Ht 5' 8\" (1.727 m)   Wt " 81.1 kg (178 lb 14.4 oz)   SpO2 98%   BMI 27.20 kg/m²     Physical Exam  Vitals and nursing note reviewed.   Constitutional:       Appearance: Normal appearance. He is well-developed and normal weight.   HENT:      Head: Normocephalic and atraumatic.      Right Ear: Tympanic membrane, ear canal and external ear normal. There is no impacted cerumen.      Left Ear: Tympanic membrane, ear canal and external ear normal. There is no impacted cerumen.      Nose: Nose normal.      Mouth/Throat:      Mouth: Mucous membranes are moist.      Pharynx: Oropharynx is clear.     Eyes:      Conjunctiva/sclera: Conjunctivae normal.       Cardiovascular:      Rate and Rhythm: Normal rate and regular rhythm.      Pulses: Normal pulses.      Heart sounds: Normal heart sounds. No murmur heard.  Pulmonary:      Effort: Pulmonary effort is normal. No respiratory distress.      Breath sounds: Normal breath sounds.   Abdominal:      General: Bowel sounds are normal. There is no distension.      Palpations: Abdomen is soft. There is no mass.      Tenderness: There is no abdominal tenderness. There is no guarding or rebound.      Hernia: No hernia is present.     Musculoskeletal:         General: Normal range of motion.      Cervical back: Normal range of motion and neck supple.     Skin:     General: Skin is warm and dry.     Neurological:      General: No focal deficit present.      Mental Status: He is alert and oriented to person, place, and time. Mental status is at baseline.     Psychiatric:         Mood and Affect: Mood normal.         Behavior: Behavior normal.         Thought Content: Thought content normal.         Judgment: Judgment normal.                [1]   No current outpatient medications on file prior to visit.

## 2025-05-14 NOTE — ASSESSMENT & PLAN NOTE
Lab Results   Component Value Date    PSA 3.9 10/27/2022    PSA 3.4 11/27/2019    PSA 3.4 05/10/2019     Patient with a history of elevation of his PSA.  He was following urology in the past.  He does have a family history of prostate cancer in his father.  PSA ordered.

## 2025-05-14 NOTE — ASSESSMENT & PLAN NOTE
Blood pressure in the office today is 138/78.  Information regarding the DASH diet provided to the patient.  Continued lifestyle modifications.

## 2025-05-19 ENCOUNTER — APPOINTMENT (OUTPATIENT)
Dept: LAB | Age: 60
End: 2025-05-19
Attending: NURSE PRACTITIONER
Payer: COMMERCIAL

## 2025-05-19 DIAGNOSIS — R79.89 LOW TESTOSTERONE IN MALE: ICD-10-CM

## 2025-05-19 DIAGNOSIS — Z13.6 SCREENING FOR CARDIOVASCULAR CONDITION: ICD-10-CM

## 2025-05-19 DIAGNOSIS — Z12.5 PROSTATE CANCER SCREENING: ICD-10-CM

## 2025-05-19 DIAGNOSIS — Z13.1 SCREENING FOR DIABETES MELLITUS: ICD-10-CM

## 2025-05-19 LAB
ALBUMIN SERPL BCG-MCNC: 4.5 G/DL (ref 3.5–5)
ALP SERPL-CCNC: 51 U/L (ref 34–104)
ALT SERPL W P-5'-P-CCNC: 23 U/L (ref 7–52)
ANION GAP SERPL CALCULATED.3IONS-SCNC: 3 MMOL/L (ref 4–13)
AST SERPL W P-5'-P-CCNC: 19 U/L (ref 13–39)
BASOPHILS # BLD AUTO: 0.03 THOUSANDS/ÂΜL (ref 0–0.1)
BASOPHILS NFR BLD AUTO: 1 % (ref 0–1)
BILIRUB SERPL-MCNC: 1 MG/DL (ref 0.2–1)
BUN SERPL-MCNC: 24 MG/DL (ref 5–25)
CALCIUM SERPL-MCNC: 9.6 MG/DL (ref 8.4–10.2)
CHLORIDE SERPL-SCNC: 106 MMOL/L (ref 96–108)
CHOLEST SERPL-MCNC: 165 MG/DL (ref ?–200)
CO2 SERPL-SCNC: 31 MMOL/L (ref 21–32)
CREAT SERPL-MCNC: 1.25 MG/DL (ref 0.6–1.3)
EOSINOPHIL # BLD AUTO: 0.24 THOUSAND/ÂΜL (ref 0–0.61)
EOSINOPHIL NFR BLD AUTO: 4 % (ref 0–6)
ERYTHROCYTE [DISTWIDTH] IN BLOOD BY AUTOMATED COUNT: 12.2 % (ref 11.6–15.1)
EST. AVERAGE GLUCOSE BLD GHB EST-MCNC: 105 MG/DL
GFR SERPL CREATININE-BSD FRML MDRD: 61 ML/MIN/1.73SQ M
GLUCOSE P FAST SERPL-MCNC: 93 MG/DL (ref 65–99)
HBA1C MFR BLD: 5.3 %
HCT VFR BLD AUTO: 48.1 % (ref 36.5–49.3)
HDLC SERPL-MCNC: 52 MG/DL
HGB BLD-MCNC: 15.5 G/DL (ref 12–17)
IMM GRANULOCYTES # BLD AUTO: 0.03 THOUSAND/UL (ref 0–0.2)
IMM GRANULOCYTES NFR BLD AUTO: 1 % (ref 0–2)
LDLC SERPL CALC-MCNC: 97 MG/DL (ref 0–100)
LYMPHOCYTES # BLD AUTO: 1.52 THOUSANDS/ÂΜL (ref 0.6–4.47)
LYMPHOCYTES NFR BLD AUTO: 27 % (ref 14–44)
MCH RBC QN AUTO: 30 PG (ref 26.8–34.3)
MCHC RBC AUTO-ENTMCNC: 32.2 G/DL (ref 31.4–37.4)
MCV RBC AUTO: 93 FL (ref 82–98)
MONOCYTES # BLD AUTO: 0.31 THOUSAND/ÂΜL (ref 0.17–1.22)
MONOCYTES NFR BLD AUTO: 6 % (ref 4–12)
NEUTROPHILS # BLD AUTO: 3.45 THOUSANDS/ÂΜL (ref 1.85–7.62)
NEUTS SEG NFR BLD AUTO: 61 % (ref 43–75)
NRBC BLD AUTO-RTO: 0 /100 WBCS
PLATELET # BLD AUTO: 191 THOUSANDS/UL (ref 149–390)
PMV BLD AUTO: 10.9 FL (ref 8.9–12.7)
POTASSIUM SERPL-SCNC: 4.7 MMOL/L (ref 3.5–5.3)
PROT SERPL-MCNC: 6.7 G/DL (ref 6.4–8.4)
PSA SERPL-MCNC: 5.06 NG/ML (ref 0–4)
RBC # BLD AUTO: 5.17 MILLION/UL (ref 3.88–5.62)
SODIUM SERPL-SCNC: 140 MMOL/L (ref 135–147)
TRIGL SERPL-MCNC: 78 MG/DL (ref ?–150)
WBC # BLD AUTO: 5.58 THOUSAND/UL (ref 4.31–10.16)

## 2025-05-19 PROCEDURE — 84402 ASSAY OF FREE TESTOSTERONE: CPT

## 2025-05-19 PROCEDURE — 85025 COMPLETE CBC W/AUTO DIFF WBC: CPT

## 2025-05-19 PROCEDURE — G0103 PSA SCREENING: HCPCS

## 2025-05-19 PROCEDURE — 80061 LIPID PANEL: CPT

## 2025-05-19 PROCEDURE — 84403 ASSAY OF TOTAL TESTOSTERONE: CPT

## 2025-05-19 PROCEDURE — 80053 COMPREHEN METABOLIC PANEL: CPT

## 2025-05-19 PROCEDURE — 83036 HEMOGLOBIN GLYCOSYLATED A1C: CPT

## 2025-05-19 PROCEDURE — 36415 COLL VENOUS BLD VENIPUNCTURE: CPT

## 2025-05-20 LAB
TESTOST FREE SERPL-MCNC: 8.6 PG/ML (ref 6.6–18.1)
TESTOST SERPL-MCNC: 521 NG/DL (ref 264–916)

## 2025-05-21 ENCOUNTER — RESULTS FOLLOW-UP (OUTPATIENT)
Dept: FAMILY MEDICINE CLINIC | Facility: CLINIC | Age: 60
End: 2025-05-21

## 2025-05-21 DIAGNOSIS — R97.20 ELEVATED PSA: ICD-10-CM

## 2025-05-21 DIAGNOSIS — Z12.5 PROSTATE CANCER SCREENING: Primary | ICD-10-CM

## 2025-07-07 ENCOUNTER — APPOINTMENT (OUTPATIENT)
Dept: LAB | Age: 60
End: 2025-07-07
Attending: NURSE PRACTITIONER
Payer: COMMERCIAL

## 2025-07-07 DIAGNOSIS — Z12.5 PROSTATE CANCER SCREENING: ICD-10-CM

## 2025-07-07 DIAGNOSIS — R97.20 ELEVATED PSA: ICD-10-CM

## 2025-07-07 LAB
PSA FREE MFR SERPL: 17.36 %
PSA FREE SERPL-MCNC: 0.93 NG/ML
PSA SERPL-MCNC: 5.36 NG/ML (ref 0–4)

## 2025-07-07 PROCEDURE — 84153 ASSAY OF PSA TOTAL: CPT

## 2025-07-07 PROCEDURE — 84154 ASSAY OF PSA FREE: CPT

## 2025-07-07 PROCEDURE — 36415 COLL VENOUS BLD VENIPUNCTURE: CPT

## 2025-07-08 ENCOUNTER — RESULTS FOLLOW-UP (OUTPATIENT)
Dept: FAMILY MEDICINE CLINIC | Facility: CLINIC | Age: 60
End: 2025-07-08

## 2025-07-08 DIAGNOSIS — R97.20 ELEVATED PSA: ICD-10-CM

## 2025-07-08 DIAGNOSIS — Z80.42 FAMILY HISTORY OF PROSTATE CANCER IN FATHER: Primary | ICD-10-CM

## 2025-07-09 ENCOUNTER — OFFICE VISIT (OUTPATIENT)
Dept: UROLOGY | Facility: AMBULATORY SURGERY CENTER | Age: 60
End: 2025-07-09
Payer: COMMERCIAL

## 2025-07-09 ENCOUNTER — TELEPHONE (OUTPATIENT)
Age: 60
End: 2025-07-09

## 2025-07-09 VITALS
DIASTOLIC BLOOD PRESSURE: 76 MMHG | WEIGHT: 145 LBS | HEART RATE: 72 BPM | BODY MASS INDEX: 21.98 KG/M2 | HEIGHT: 68 IN | SYSTOLIC BLOOD PRESSURE: 126 MMHG | OXYGEN SATURATION: 98 %

## 2025-07-09 DIAGNOSIS — R97.20 ELEVATED PSA: ICD-10-CM

## 2025-07-09 DIAGNOSIS — Z80.42 FAMILY HISTORY OF PROSTATE CANCER IN FATHER: ICD-10-CM

## 2025-07-09 DIAGNOSIS — N40.1 BENIGN PROSTATIC HYPERPLASIA WITH WEAK URINARY STREAM: Primary | ICD-10-CM

## 2025-07-09 DIAGNOSIS — R39.12 BENIGN PROSTATIC HYPERPLASIA WITH WEAK URINARY STREAM: Primary | ICD-10-CM

## 2025-07-09 DIAGNOSIS — N52.9 ERECTILE DYSFUNCTION, UNSPECIFIED ERECTILE DYSFUNCTION TYPE: ICD-10-CM

## 2025-07-09 PROCEDURE — 99213 OFFICE O/P EST LOW 20 MIN: CPT

## 2025-07-09 RX ORDER — ZINC GLUCONATE 50 MG
50 TABLET ORAL DAILY
COMMUNITY

## 2025-07-09 RX ORDER — TADALAFIL 10 MG/1
10 TABLET ORAL DAILY PRN
Qty: 30 TABLET | Refills: 0 | Status: SHIPPED | OUTPATIENT
Start: 2025-07-09

## 2025-07-09 NOTE — TELEPHONE ENCOUNTER
PA for TADALAFIL 10 MG  DENIED    Reason:(Screenshot if applicable)  ED MEDS NOT COVERED    PT CAN USE GOOD RX  PHARMACY AWARE OF DENIAL

## 2025-07-09 NOTE — PROGRESS NOTES
7/9/2025      Assessment and Plan    60 y.o. male managed by Dr. Hein    Elevated PSA  Patient reports a positive family history for prostate cancer  Multiparametric MRI of the prostate performed 9/30/2022 grade the patient has PI-RADS category 2 with no suspicious lesion for prostate cancer being seen.  Patient's most recent PSA was performed 7/7/2025 and returned elevated a value of 5.358.  Refer to PSA trend below.  We reviewed the patient's most recent PSA and his PSA trend in the office today.  We discussed that with 2 consecutive PSA elevations that my recommendation would be to proceed with scheduling a multiparametric MRI of the prostate.  We reviewed the PI-RADS grading scale in detail.  Patient will be scheduled for a multiparametric MRI of the prostate.  The patient has greatest PI-RADS category 4 or 5 then my recommendation would be to proceed with an MRI fusion transperineal prostate biopsy.  Our office will call with MRI results.    Lab Results   Component Value Date    PSA 5.358 (H) 07/07/2025    PSA 5.064 (H) 05/19/2025    PSA 3.9 10/27/2022        Erectile dysfunction  Testosterone performed 5/19/2025 returned normal at a value of 521  We discussed that erectile dysfunction is multifactorial in nature and can be affected by high blood sugar, high cholesterol, high blood pressure, natural age, mental block, and testosterone levels.  We discussed that better control in these aspects of our health can improve our quality of erections.  We discussed pharmacotherapy in the forms of PDE 5 inhibitors.  After discussion, the patient will trial Cialis 10 mg as needed 1 hour prior to sexual activity.  Side effects reviewed in the office today.        History of Present Illness  Ward Calderón is a 60 y.o. male here for evaluation of increased PSA velocity.  Patient was last seen in the office on 8/17/2022.  Patient being referred back to our practice for an elevated PSA.    Patient has a longstanding  "history of an elevated PSA with a being as high as 6.09 on 7/13/2022.  At that time the PSA prompted the obtainment of a multiparametric MRI of the prostate.  Patient was graded as PI-RADS category 2 with no suspicious lesion for prostate cancer being seen and a prostate volume of 35.7 cc.  Patient's PSA was retested thereafter on 10/27/2022 and returned near his baseline at a value of 3.9.     Furthermore, the [facility]-R[facility] category 2.    Patient's most recent PSA was performed 7/7/2025 and found to be elevated a value of 5.358.    Patient is not currently on any pharmacotherapy for treatment of lower urinary tract symptoms.    Today, the patient offers no concerning lower urinary tract symptoms.  Patient reports nocturia ranging anywhere between 0-1 time a night.  Patient reports that he may have a weakened urinary stream during the night or first thing in the morning, but a good flow the rest of the day.  Patient is unbothered by his frequency/urgency.  Patient is concerned about his PSA result and notes that his father was diagnosed with prostate cancer around the same age and underwent prostatectomy for treatment.  Furthermore, the patient does endorse some degree of erectile dysfunction and notes that he is able to achieve an erection, but is not always the same strength or last as long as he would like.  Otherwise, patient offers no other complaints today's office visit.    Review of Systems             Vitals  Vitals:    07/09/25 1402   BP: 126/76   BP Location: Left arm   Patient Position: Sitting   Cuff Size: Standard   Pulse: 72   SpO2: 98%   Weight: 65.8 kg (145 lb)   Height: 5' 8\" (1.727 m)       Physical Exam      Past History  Past Medical History[1]  Social History[2]  Tobacco Use History[3]  Family History[4]    The following portions of the patient's history were reviewed and updated as appropriate: allergies, current medications, " past medical history, past social history, past surgical history and problem list.    Results  No results found for this or any previous visit (from the past hour).]  Lab Results   Component Value Date    PSA 5.358 (H) 07/07/2025    PSA 5.064 (H) 05/19/2025    PSA 3.9 10/27/2022    PSA 3.4 11/27/2019     Lab Results   Component Value Date    CALCIUM 9.6 05/19/2025    K 4.7 05/19/2025    CO2 31 05/19/2025     05/19/2025    BUN 24 05/19/2025    CREATININE 1.25 05/19/2025     Lab Results   Component Value Date    WBC 5.58 05/19/2025    HGB 15.5 05/19/2025    HCT 48.1 05/19/2025    MCV 93 05/19/2025     05/19/2025             [1]   Past Medical History:  Diagnosis Date    Obesity    [2]   Social History  Socioeconomic History    Marital status: /Civil Union   Tobacco Use    Smoking status: Never    Smokeless tobacco: Never   Vaping Use    Vaping status: Never Used   Substance and Sexual Activity    Alcohol use: Yes     Comment: Social 1-2 a month    Drug use: Never    Sexual activity: Yes     Partners: Female   Social History Narrative    Caffeine use    [3]   Social History  Tobacco Use   Smoking Status Never   Smokeless Tobacco Never   [4]   Family History  Problem Relation Name Age of Onset    Schizophrenia Mother      Cerebral aneurysm Father      Coronary artery disease Father      Prostate cancer Father      Heart attack Father      No Known Problems Sister      Diabetes Maternal Grandfather

## 2025-07-09 NOTE — ASSESSMENT & PLAN NOTE
Patient reports a positive family history for prostate cancer  Multiparametric MRI of the prostate performed 9/30/2022 grade the patient has PI-RADS category 2 with no suspicious lesion for prostate cancer being seen.  Patient's most recent PSA was performed 7/7/2025 and returned elevated a value of 5.358.  Refer to PSA trend below.  We reviewed the patient's most recent PSA and his PSA trend in the office today.  We discussed that with 2 consecutive PSA elevations that my recommendation would be to proceed with scheduling a multiparametric MRI of the prostate.  We reviewed the PI-RADS grading scale in detail.  Patient will be scheduled for a multiparametric MRI of the prostate.  The patient has greatest PI-RADS category 4 or 5 then my recommendation would be to proceed with an MRI fusion transperineal prostate biopsy.  Our office will call with MRI results.    Lab Results   Component Value Date    PSA 5.358 (H) 07/07/2025    PSA 5.064 (H) 05/19/2025    PSA 3.9 10/27/2022

## 2025-07-09 NOTE — ASSESSMENT & PLAN NOTE
Testosterone performed 5/19/2025 returned normal at a value of 521  We discussed that erectile dysfunction is multifactorial in nature and can be affected by high blood sugar, high cholesterol, high blood pressure, natural age, mental block, and testosterone levels.  We discussed that better control in these aspects of our health can improve our quality of erections.  We discussed pharmacotherapy in the forms of PDE 5 inhibitors.  After discussion, the patient will trial Cialis 10 mg as needed 1 hour prior to sexual activity.  Side effects reviewed in the office today.

## 2025-08-13 ENCOUNTER — HOSPITAL ENCOUNTER (OUTPATIENT)
Dept: RADIOLOGY | Age: 60
Discharge: HOME | End: 2025-08-13
Payer: COMMERCIAL

## (undated) DEVICE — 3M™ TEGADERM™ TRANSPARENT FILM DRESSING FRAME STYLE, 1624W, 2-3/8 IN X 2-3/4 IN (6 CM X 7 CM), 100/CT 4CT/CASE: Brand: 3M™ TEGADERM™

## (undated) DEVICE — CHLORAPREP HI-LITE 26ML ORANGE

## (undated) DEVICE — BETHLEHEM UNIVERSAL MINOR GEN: Brand: CARDINAL HEALTH

## (undated) DEVICE — SUT MONOCRYL 4-0 PS-2 27 IN Y426H

## (undated) DEVICE — NEEDLE 25G X 1 1/2

## (undated) DEVICE — COTTON BALLS: Brand: DEROYAL

## (undated) DEVICE — ADHESIVE SKN CLSR HISTOACRYL FLEX 0.5ML LF

## (undated) DEVICE — GLOVE SRG BIOGEL ORTHOPEDIC 7.5

## (undated) DEVICE — PLUMEPEN PRO 10FT

## (undated) DEVICE — GLOVE INDICATOR PI UNDERGLOVE SZ 8 BLUE

## (undated) DEVICE — INTENDED FOR TISSUE SEPARATION, AND OTHER PROCEDURES THAT REQUIRE A SHARP SURGICAL BLADE TO PUNCTURE OR CUT.: Brand: BARD-PARKER SAFETY BLADES SIZE 11, STERILE

## (undated) DEVICE — INTENDED FOR TISSUE SEPARATION, AND OTHER PROCEDURES THAT REQUIRE A SHARP SURGICAL BLADE TO PUNCTURE OR CUT.: Brand: BARD-PARKER SAFETY BLADES SIZE 15, STERILE